# Patient Record
Sex: MALE | Race: WHITE | NOT HISPANIC OR LATINO
[De-identification: names, ages, dates, MRNs, and addresses within clinical notes are randomized per-mention and may not be internally consistent; named-entity substitution may affect disease eponyms.]

---

## 2017-08-28 ENCOUNTER — TRANSCRIPTION ENCOUNTER (OUTPATIENT)
Age: 52
End: 2017-08-28

## 2017-09-12 ENCOUNTER — TRANSCRIPTION ENCOUNTER (OUTPATIENT)
Age: 52
End: 2017-09-12

## 2018-02-15 ENCOUNTER — TRANSCRIPTION ENCOUNTER (OUTPATIENT)
Age: 53
End: 2018-02-15

## 2018-09-13 ENCOUNTER — TRANSCRIPTION ENCOUNTER (OUTPATIENT)
Age: 53
End: 2018-09-13

## 2018-12-24 ENCOUNTER — TRANSCRIPTION ENCOUNTER (OUTPATIENT)
Age: 53
End: 2018-12-24

## 2019-01-28 PROBLEM — Z00.00 ENCOUNTER FOR PREVENTIVE HEALTH EXAMINATION: Status: ACTIVE | Noted: 2019-01-28

## 2019-02-06 ENCOUNTER — APPOINTMENT (OUTPATIENT)
Dept: PAIN MANAGEMENT | Facility: CLINIC | Age: 54
End: 2019-02-06
Payer: COMMERCIAL

## 2019-02-06 VITALS
HEIGHT: 69 IN | DIASTOLIC BLOOD PRESSURE: 82 MMHG | BODY MASS INDEX: 28.14 KG/M2 | SYSTOLIC BLOOD PRESSURE: 136 MMHG | WEIGHT: 190 LBS

## 2019-02-06 PROCEDURE — 99204 OFFICE O/P NEW MOD 45 MIN: CPT

## 2019-02-06 NOTE — PHYSICAL EXAM
[Facet Tenderness] : facet tenderness [Spine: Flexion to ___ degrees, without pain] : spine: flexion to [unfilled] degrees, without pain [] : Motor: [NL] : normal and symmetric bilaterally [Normal] : Normal affect [de-identified] : left scapulothoracic bursa TTP

## 2019-02-06 NOTE — ASSESSMENT
[FreeTextEntry1] : mid back pain likely secondary to lumbar radiculopathy and discogenic pain refractory to conservative treatments, will obtain MRI CS and TS to evaluate for pathology\par \par may consider PT vs intervention pending eval\par \par Significant scapulothoracic pain refractory to conservative treatments.  Will schedule right scapulothoracic steroid injection r/b/a discussed\par \par informed patient of risks of steroid administration including transient worsening of blood glucose, htn, mood changes, progressive osteoporosis.  Encouraged to call with questions and concerns.\par \par trial voltaren prn pain\par \par The above diagnosis and treatment plan is medically reasonable and necessary based on the patient encounter.\par \par There were no barriers to communication.\par Informed patient that I would be available for any additional questions.\par Patient was instructed to call with any worsening symptoms including severe pain, new numbness/weakness, or changes in the bowel/bladder function. \par \par Discussed role of nsaids in pain management and all relevant risks, if patient is continuing to require after 4 weeks the patient should f/u for alternative treatment. \par \par Instructed patient to maintain pain diary to monitor pain level, mobility, and function.\par \par The referring provider was informed of the above diagnosis and treatment plan.\par \par

## 2019-02-06 NOTE — HISTORY OF PRESENT ILLNESS
[3] : a maximum pain level of 3/10 [Sharp] : sharp [Aching] : aching [Throbbing] : throbbing [Sitting] : sitting [Rest] : rest [FreeTextEntry1] : HPI\par \par Mr. MIRYAM SOSA is a 54 year M with pmhx of bilateral knee arthroscopic repair presents with left scapulothoracic pain\par \par \par Previous and current pain medications/doses/effects:\par \par Medrol pack without relief\par \par Previous Pain Treatments:\par \par Massage and PT without relief\par Acupuncture without relief\par \par Previous Pain Injections:\par \par Previous Diagnostic Studies/Images:\par \par MRI R Shoulder\par \par Mild/moderate supraspinatus tendinosis with superimposed low-grade partial-thickness articular   \par surface tearing of the anterior most insertional fibers.  \par  \par  Mild infraspinatus and subscapularis tendinosis without evidence for tear..  \par  \par  Small subacromial enthesophyte, for which evaluation for associated subacromial impingement on   \par physical examination is recommended.  \par  \par  [FreeTextEntry7] : left scapulothoracic pain [de-identified] : Gripping [FreeTextEntry4] : stretching

## 2019-02-06 NOTE — REASON FOR VISIT
[Initial Consultation] : an initial pain management consultation [FreeTextEntry2] : referred by Dr. Ba  for evaluation of mid back pain

## 2019-02-14 ENCOUNTER — APPOINTMENT (OUTPATIENT)
Dept: PAIN MANAGEMENT | Facility: HOSPITAL | Age: 54
End: 2019-02-14

## 2019-02-20 ENCOUNTER — RESULT REVIEW (OUTPATIENT)
Age: 54
End: 2019-02-20

## 2019-02-27 ENCOUNTER — APPOINTMENT (OUTPATIENT)
Dept: PAIN MANAGEMENT | Facility: CLINIC | Age: 54
End: 2019-02-27
Payer: COMMERCIAL

## 2019-02-27 VITALS
BODY MASS INDEX: 28.14 KG/M2 | DIASTOLIC BLOOD PRESSURE: 90 MMHG | WEIGHT: 190 LBS | HEIGHT: 69 IN | SYSTOLIC BLOOD PRESSURE: 138 MMHG

## 2019-02-27 PROCEDURE — 99214 OFFICE O/P EST MOD 30 MIN: CPT

## 2019-02-27 NOTE — ASSESSMENT
[FreeTextEntry1] : mid back pain likely secondary to lumbar radiculopathy and discogenic pain refractory to conservative treatments, \par \par \par MRI demonstrating disc herniation causing radiculopathy, significantly impactful to ability to perform ADL and refractory to conservative treatments.  Will schedule C7-T1 interlaminar epidural steroid injection r/b/a discussed\par \par informed patient of risks of steroid administration including transient worsening of blood glucose, htn, mood changes, progressive osteoporosis.  Encouraged to call with questions and concerns.\par \par may consider PT vs intervention pending eval\par \par s/p Significant scapulothoracic pain refractory to conservative treatments. s/p right scapulothoracic steroid injection with improvement in bursitis pain\par \par trial voltaren prn pain\par \par The above diagnosis and treatment plan is medically reasonable and necessary based on the patient encounter.\par \par There were no barriers to communication.\par Informed patient that I would be available for any additional questions.\par Patient was instructed to call with any worsening symptoms including severe pain, new numbness/weakness, or changes in the bowel/bladder function. \par \par Discussed role of nsaids in pain management and all relevant risks, if patient is continuing to require after 4 weeks the patient should f/u for alternative treatment. \par \par Instructed patient to maintain pain diary to monitor pain level, mobility, and function.\par \par \par \par

## 2019-02-27 NOTE — HISTORY OF PRESENT ILLNESS
[3] : a maximum pain level of 3/10 [Sharp] : sharp [Aching] : aching [Throbbing] : throbbing [Sitting] : sitting [Rest] : rest [FreeTextEntry1] : Interval Note:\par s/p left scapulothoracic bursa steroid injection with improvement over left shoulder, however now complains of worsening pain over left shoulder, lateral arm and upper left paraspinal region, worse with sitting, cervical motion.  Described as stabbing.  Significantly affecting ADLs\par Since last visit the pain is improved. Denies any additional weakness, numbness, bowel/bladder dysfunction.  Pain intensity is 5/10\par \par \par HPI\par \par Mr. MIRYAM SOSA is a 54 year M with pmhx of bilateral knee arthroscopic repair presents with left scapulothoracic pain\par \par \par Previous and current pain medications/doses/effects:\par \par Medrol pack without relief\par \par Previous Pain Treatments:\par \par Massage and PT without relief\par Acupuncture without relief\par \par Previous Pain Injections:\par  left scapulothoracic steroid injection 2/14/19\par \par Previous Diagnostic Studies/Images:\par \par MRI CS 2/2019\par \par There is a mild levoscoliosis of the lower cervical and upper thoracic spine and there is\par straightening of the cervical lordosis. The vertebral column alignment is grossly maintained. The\par marrow signal is overall within normal limits with no focal marrow replacing lesion identified. The\par vertebral body heights are maintained and there is no evidence of acute fracture or subluxation.\par There is very mild degenerative endplate edema at C5-6 and C6-7. There is otherwise no abnormal\par vertebral or paraspinal edema. The visualized paraspinal soft tissues appear grossly unremarkable.\par \par  No abnormal signal is identified in the cervical spinal cord. The visualized posterior fossa\par structures are unremarkable.\par \par  C2-3: No significant disc bulge or herniation. Bilateral facet hypertrophy. No significant central\par canal stenosis. Mild bilateral foraminal stenosis.\par \par  C3-4: Minimal disc bulge. Bilateral uncovertebral osteophyte formation, right greater than left. No\par significant central canal stenosis. Moderate/severe right foraminal stenosis and moderate left\par foraminal stenosis.\par \par  C4-5: Mild disc bulge and mild uncovertebral productive changes. No significant central canal\par stenosis. Moderate bilateral foraminal stenosis.\par \par  C5-6: Mild disc bulge with marginal and uncovertebral osteophyte formation. No significant central\par canal stenosis. Severe right foraminal stenosis and moderate/severe left foraminal stenosis.\par \par  C6-7: Mild disc bulge with marginal and uncovertebral osteophyte formation. Mild central canal\par stenosis. Severe bilateral foraminal stenosis.\par \par  C7-T1: No significant disc bulge or herniation. No significant central canal or foraminal stenosis.\par \par \par \par  IMPRESSION:\par \par  Cervical spondylosis. Mild central canal stenosis C6-7. Significant multilevel foraminal stenosis\par as above.\par \par MRI TS 2/2019\par \par There is a mild dextroscoliosis of the thoracic spine and there is mild increase in the thoracic\par kyphosis. The vertebral column alignment is overall maintained. The marrow signal is normal with no\par focal marrow replacing lesion identified. The vertebral body heights are maintained and there is no\par evidence of acute fracture or subluxation. Several small Schmorl's nodes are noted throughout the\par thoracic spine. There is no abnormal vertebral or paraspinal edema. The visualized paraspinal soft\par tissues appear grossly unremarkable.\par \par  No abnormal signal is identified in the thoracic spinal cord. The conus medullaris terminates at\par L1.\par \par  There are degenerative changes of the thoracic spine including mild multilevel disc space narrowing\par in its rule out endplate osteophyte formation as well as mild to moderate multilevel facet\par arthropathy. There is mildly prominent dorsal epidural fat extending from T3-T9.\par \par  At T3-4, there are small bilateral paracentral disc protrusions.\par \par  At T4-5, there is a small left paracentral disc protrusion mildly impinging upon the left ventral\par spinal cord.\par \par  At T5-6, there is a small right paracentral disc protrusion touching the right ventral spinal cord\par without compression\par \par  At T6-7, there is a tiny right paracentral disc protrusion.\par \par  At T7-8 and T8-9, there is mild disc bulging with mild encroachment upon the ventral spinal cord.\par \par  At T10-11, there is a moderate right paracentral disc protrusion mildly encroaching upon the right\par ventral spinal cord.\par \par  There is mild central canal stenosis at T4-5, T7-8, T8-9, and T10-11. There is mild foraminal\par stenosis at T9-10 and T10-11 on the right.\par \par \par \par  IMPRESSION:\par \par  Thoracic scoliosis and spondylosis with numerous superimposed disc herniations resulting in mild\par central canal and foraminal stenosis, as detailed above.\par \par \par MRI R Shoulder\par \par Mild/moderate supraspinatus tendinosis with superimposed low-grade partial-thickness articular   \par surface tearing of the anterior most insertional fibers.  \par  \par  Mild infraspinatus and subscapularis tendinosis without evidence for tear..  \par  \par  Small subacromial enthesophyte, for which evaluation for associated subacromial impingement on   \par physical examination is recommended.  \par  \par  [FreeTextEntry7] : left scapulothoracic pain [de-identified] : Gripping [FreeTextEntry4] : stretching

## 2019-02-27 NOTE — PHYSICAL EXAM
[Normal muscle bulk without asymmetry] : normal muscle bulk without asymmetry [Spine: Flexion to ___ degrees, without pain] : spine: flexion to [unfilled] degrees, without pain [Spurling] : positive Spurling's Test [] : Motor: [NL] : normal and symmetric bilaterally [Normal] : Normal affect

## 2019-03-14 ENCOUNTER — APPOINTMENT (OUTPATIENT)
Dept: PAIN MANAGEMENT | Facility: HOSPITAL | Age: 54
End: 2019-03-14

## 2019-03-15 ENCOUNTER — APPOINTMENT (OUTPATIENT)
Dept: PAIN MANAGEMENT | Facility: HOSPITAL | Age: 54
End: 2019-03-15

## 2019-04-05 ENCOUNTER — APPOINTMENT (OUTPATIENT)
Dept: PAIN MANAGEMENT | Facility: HOSPITAL | Age: 54
End: 2019-04-05

## 2019-04-05 ENCOUNTER — RESULT REVIEW (OUTPATIENT)
Age: 54
End: 2019-04-05

## 2019-04-17 ENCOUNTER — APPOINTMENT (OUTPATIENT)
Dept: PAIN MANAGEMENT | Facility: CLINIC | Age: 54
End: 2019-04-17
Payer: COMMERCIAL

## 2019-04-17 VITALS
BODY MASS INDEX: 28.14 KG/M2 | SYSTOLIC BLOOD PRESSURE: 140 MMHG | DIASTOLIC BLOOD PRESSURE: 92 MMHG | WEIGHT: 190 LBS | HEIGHT: 69 IN

## 2019-04-17 DIAGNOSIS — M47.814 SPONDYLOSIS W/OUT MYELOPATHY OR RADICULOPATHY, THORACIC REGION: ICD-10-CM

## 2019-04-17 PROCEDURE — 99214 OFFICE O/P EST MOD 30 MIN: CPT

## 2019-04-17 RX ORDER — DICLOFENAC SODIUM 10 MG/G
1 GEL TOPICAL
Qty: 1 | Refills: 1 | Status: DISCONTINUED | COMMUNITY
Start: 2019-02-06 | End: 2019-04-17

## 2019-04-17 NOTE — HISTORY OF PRESENT ILLNESS
[Sharp] : sharp [3] : a maximum pain level of 3/10 [Aching] : aching [Throbbing] : throbbing [Sitting] : sitting [Rest] : rest [FreeTextEntry1] : Interval Note:\par \par s/p  C7-T1 interlaminar epidural steroid injection 4/17/19 with improvement in upper back shoulder and arm pain.  Does complain of mild tingling in his fingers.  \par Since last visit the pain is improved. Denies any additional weakness, numbness, bowel/bladder dysfunction.  Pain intensity is 1/10\par \par \par HPI\par \par Mr. MIRYAM SOSA is a 54 year M with pmhx of bilateral knee arthroscopic repair presents with left scapulothoracic pain\par \par \par Previous and current pain medications/doses/effects:\par \par Medrol pack without relief\par \par Previous Pain Treatments:\par \par Massage and PT without relief\par Acupuncture without relief\par \par Previous Pain Injections:\par C7-T1 interlaminar epidural steroid injection 4/17/19\par  left scapulothoracic steroid injection 2/14/19\par \par Previous Diagnostic Studies/Images:\par \par MRI CS 2/2019\par \par There is a mild levoscoliosis of the lower cervical and upper thoracic spine and there is\par straightening of the cervical lordosis. The vertebral column alignment is grossly maintained. The\par marrow signal is overall within normal limits with no focal marrow replacing lesion identified. The\par vertebral body heights are maintained and there is no evidence of acute fracture or subluxation.\par There is very mild degenerative endplate edema at C5-6 and C6-7. There is otherwise no abnormal\par vertebral or paraspinal edema. The visualized paraspinal soft tissues appear grossly unremarkable.\par \par  No abnormal signal is identified in the cervical spinal cord. The visualized posterior fossa\par structures are unremarkable.\par \par  C2-3: No significant disc bulge or herniation. Bilateral facet hypertrophy. No significant central\par canal stenosis. Mild bilateral foraminal stenosis.\par \par  C3-4: Minimal disc bulge. Bilateral uncovertebral osteophyte formation, right greater than left. No\par significant central canal stenosis. Moderate/severe right foraminal stenosis and moderate left\par foraminal stenosis.\par \par  C4-5: Mild disc bulge and mild uncovertebral productive changes. No significant central canal\par stenosis. Moderate bilateral foraminal stenosis.\par \par  C5-6: Mild disc bulge with marginal and uncovertebral osteophyte formation. No significant central\par canal stenosis. Severe right foraminal stenosis and moderate/severe left foraminal stenosis.\par \par  C6-7: Mild disc bulge with marginal and uncovertebral osteophyte formation. Mild central canal\par stenosis. Severe bilateral foraminal stenosis.\par \par  C7-T1: No significant disc bulge or herniation. No significant central canal or foraminal stenosis.\par \par \par \par  IMPRESSION:\par \par  Cervical spondylosis. Mild central canal stenosis C6-7. Significant multilevel foraminal stenosis\par as above.\par \par MRI TS 2/2019\par \par There is a mild dextroscoliosis of the thoracic spine and there is mild increase in the thoracic\par kyphosis. The vertebral column alignment is overall maintained. The marrow signal is normal with no\par focal marrow replacing lesion identified. The vertebral body heights are maintained and there is no\par evidence of acute fracture or subluxation. Several small Schmorl's nodes are noted throughout the\par thoracic spine. There is no abnormal vertebral or paraspinal edema. The visualized paraspinal soft\par tissues appear grossly unremarkable.\par \par  No abnormal signal is identified in the thoracic spinal cord. The conus medullaris terminates at\par L1.\par \par  There are degenerative changes of the thoracic spine including mild multilevel disc space narrowing\par in its rule out endplate osteophyte formation as well as mild to moderate multilevel facet\par arthropathy. There is mildly prominent dorsal epidural fat extending from T3-T9.\par \par  At T3-4, there are small bilateral paracentral disc protrusions.\par \par  At T4-5, there is a small left paracentral disc protrusion mildly impinging upon the left ventral\par spinal cord.\par \par  At T5-6, there is a small right paracentral disc protrusion touching the right ventral spinal cord\par without compression\par \par  At T6-7, there is a tiny right paracentral disc protrusion.\par \par  At T7-8 and T8-9, there is mild disc bulging with mild encroachment upon the ventral spinal cord.\par \par  At T10-11, there is a moderate right paracentral disc protrusion mildly encroaching upon the right\par ventral spinal cord.\par \par  There is mild central canal stenosis at T4-5, T7-8, T8-9, and T10-11. There is mild foraminal\par stenosis at T9-10 and T10-11 on the right.\par \par \par \par  IMPRESSION:\par \par  Thoracic scoliosis and spondylosis with numerous superimposed disc herniations resulting in mild\par central canal and foraminal stenosis, as detailed above.\par \par \par MRI R Shoulder\par \par Mild/moderate supraspinatus tendinosis with superimposed low-grade partial-thickness articular   \par surface tearing of the anterior most insertional fibers.  \par  \par  Mild infraspinatus and subscapularis tendinosis without evidence for tear..  \par  \par  Small subacromial enthesophyte, for which evaluation for associated subacromial impingement on   \par physical examination is recommended.  \par  \par  [FreeTextEntry7] : left scapulothoracic pain [de-identified] : Gripping [FreeTextEntry4] : stretching

## 2019-04-17 NOTE — PHYSICAL EXAM
[Normal muscle bulk without asymmetry] : normal muscle bulk without asymmetry [Spine: Flexion to ___ degrees, without pain] : spine: flexion to [unfilled] degrees, without pain [] : Motor: [NL] : normal and symmetric bilaterally [Normal] : Normal affect

## 2019-04-17 NOTE — ASSESSMENT
[FreeTextEntry1] : mid back pain likely secondary to lumbar radiculopathy and discogenic pain refractory to conservative treatments, \par \par \par MRI demonstrating disc herniation causing radiculopathy, significantly impactful to ability to perform ADL and refractory to conservative treatments.  s/p C7-T1 interlaminar epidural steroid injection \par \par start PT\par \par s/p Significant scapulothoracic pain refractory to conservative treatments. s/p right scapulothoracic steroid injection with improvement in bursitis pain\par \par trial voltaren prn pain\par \par The above diagnosis and treatment plan is medically reasonable and necessary based on the patient encounter.\par \par There were no barriers to communication.\par Informed patient that I would be available for any additional questions.\par Patient was instructed to call with any worsening symptoms including severe pain, new numbness/weakness, or changes in the bowel/bladder function. \par \par Instructed patient to maintain pain diary to monitor pain level, mobility, and function.\par \par \par \par

## 2019-10-16 ENCOUNTER — RESULT REVIEW (OUTPATIENT)
Age: 54
End: 2019-10-16

## 2019-11-13 ENCOUNTER — APPOINTMENT (OUTPATIENT)
Dept: PAIN MANAGEMENT | Facility: CLINIC | Age: 54
End: 2019-11-13
Payer: COMMERCIAL

## 2019-11-13 VITALS
WEIGHT: 190 LBS | DIASTOLIC BLOOD PRESSURE: 92 MMHG | BODY MASS INDEX: 28.14 KG/M2 | HEIGHT: 69 IN | SYSTOLIC BLOOD PRESSURE: 164 MMHG

## 2019-11-13 DIAGNOSIS — M47.24 OTHER SPONDYLOSIS WITH RADICULOPATHY, THORACIC REGION: ICD-10-CM

## 2019-11-13 PROCEDURE — 99214 OFFICE O/P EST MOD 30 MIN: CPT

## 2019-11-13 NOTE — DATA REVIEWED
[FreeTextEntry1] : \par  [place] MRI Report             Final\par \par No Documents Attached\par \par \par \par \par   [facility]\par                                          [address]\par                                    [place], [place]  [address]\par                                        Department of Radiology\par                                             [phone]\par \par \par Patient Name:   MIRYAM SOSA        Location:[facility]\par Med Rec #:   [id number]        Account #: [id number]\par YOB: 1965       Ordering:Beatris Ahuja DO\par Age: 54       Sex:    M        Attending:Beatris Ahuja DO\par PCP:        Frank Cheung MD\par ______________________________________________________________________________________\par \par Exam Date:   02/20/19\par Exam:     MRI CERVICAL SPINE\par Order#:   [id number]\par \par \par \par MRI OF THE CERVICAL SPINE WITHOUT CONTRAST\par \par  INDICATION:  Cervical spondylosis without myelopathy. Left neck/scapular area pain\par \par  TECHNIQUE: Noncontrast sagittal T1, T2, STIR, axial T2 and gradient echo, and sagittal T2\par volumetric with axial and coronal reformats.\par \par  CONTRAST: None\par \par  COMPARISON:  No prior studies are available for comparison\par \par  FINDINGS:\par \par  There is a mild levoscoliosis of the lower cervical and upper thoracic spine and there is\par straightening of the cervical lordosis. The vertebral column alignment is grossly maintained. The\par marrow signal is overall within normal limits with no focal marrow replacing lesion identified. The\par vertebral body heights are maintained and there is no evidence of acute fracture or subluxation.\par There is very mild degenerative endplate edema at C5-6 and C6-7. There is otherwise no abnormal\par vertebral or paraspinal edema. The visualized paraspinal soft tissues appear grossly unremarkable.\par \par  No abnormal signal is identified in the cervical spinal cord. The visualized posterior fossa\par structures are unremarkable.\par \par  C2-3: No significant disc bulge or herniation. Bilateral facet hypertrophy. No significant central\par canal stenosis. Mild bilateral foraminal stenosis.\par \par  C3-4: Minimal disc bulge. Bilateral uncovertebral osteophyte formation, right greater than left. No\par significant central canal stenosis. Moderate/severe right foraminal stenosis and moderate left\par foraminal stenosis.\par \par  C4-5: Mild disc bulge and mild uncovertebral productive changes. No significant central canal\par stenosis. Moderate bilateral foraminal stenosis.\par \par  C5-6: Mild disc bulge with marginal and uncovertebral osteophyte formation. No significant central\par canal stenosis. Severe right foraminal stenosis and moderate/severe left foraminal stenosis.\par \par  C6-7: Mild disc bulge with marginal and uncovertebral osteophyte formation. Mild central canal\par stenosis. Severe bilateral foraminal stenosis.\par \par  C7-T1: No significant disc bulge or herniation. No significant central canal or foraminal stenosis.\par \par \par \par  IMPRESSION:\par \par  Cervical spondylosis. Mild central canal stenosis C6-7. Significant multilevel foraminal stenosis\par as above.\par \par \par ***Electronically Signed ***\par -----------------------------------------------\par Ted SOLIMAN MD              02/20/19 1328\par \par Dictated on 02/20/19 1307\par \par \par

## 2019-11-13 NOTE — PHYSICAL EXAM
[Spine: Flexion to ___ degrees, without pain] : spine: flexion to [unfilled] degrees, without pain [Normal muscle bulk without asymmetry] : normal muscle bulk without asymmetry [] : Motor: [NL] : normal and symmetric bilaterally [Normal] : Normal affect

## 2019-11-13 NOTE — ASSESSMENT
[FreeTextEntry1] : 54 yom presents w/ severe neck and left arm pain, similar to previous pain which had 100% relief of pain with epidural steroid injection. Pain has returned and is severe, patient cannot function due to pain. The patient has failed to have relief with medication management and physical therapy. Given the patients failure to improve with all other conservative measures, and excellent relief w/ previous intervention, recommend C7-T1 interlaminar epidural steroid injection under fluoroscopic guidance. The patient will follow-up with me in my office two weeks following intervention. Start cyclobenzaprine 5 mg TID prn.\par

## 2019-11-13 NOTE — HISTORY OF PRESENT ILLNESS
[3] : a maximum pain level of 3/10 [Sharp] : sharp [Aching] : aching [Throbbing] : throbbing [Sitting] : sitting [Rest] : rest [FreeTextEntry1] : Interval Note on 11/13/19. Patient reports that pain has returned and is severe in intensity. Pain is in the right upper neck and into the arm. He had 100% pain relief since previous injection. Pain is worst w/ sitting. Relieved when he lies on his stomach. Desires repeat intervention.\par \par As per Dr. Ahuja" 4/17/19: s/p  C7-T1 interlaminar epidural steroid injection 4/17/19 with improvement in upper back shoulder and arm pain.  Does complain of mild tingling in his fingers.  \par Since last visit the pain is improved. Denies any additional weakness, numbness, bowel/bladder dysfunction.  Pain intensity is 1/10\par \par \par HPI\par \par Mr. MIRYAM SOSA is a 54 year M with pmhx of bilateral knee arthroscopic repair presents with left scapulothoracic pain\par \par \par Previous and current pain medications/doses/effects:\par \par Medrol pack without relief\par \par Previous Pain Treatments:\par \par Massage and PT without relief\par Acupuncture without relief\par \par Previous Pain Injections:\par C7-T1 interlaminar epidural steroid injection 4/17/19\par  left scapulothoracic steroid injection 2/14/19\par \par Previous Diagnostic Studies/Images:\par \par MRI CS 2/2019\par \par There is a mild levoscoliosis of the lower cervical and upper thoracic spine and there is\par straightening of the cervical lordosis. The vertebral column alignment is grossly maintained. The\par marrow signal is overall within normal limits with no focal marrow replacing lesion identified. The\par vertebral body heights are maintained and there is no evidence of acute fracture or subluxation.\par There is very mild degenerative endplate edema at C5-6 and C6-7. There is otherwise no abnormal\par vertebral or paraspinal edema. The visualized paraspinal soft tissues appear grossly unremarkable.\par \par  No abnormal signal is identified in the cervical spinal cord. The visualized posterior fossa\par structures are unremarkable.\par \par  C2-3: No significant disc bulge or herniation. Bilateral facet hypertrophy. No significant central\par canal stenosis. Mild bilateral foraminal stenosis.\par \par  C3-4: Minimal disc bulge. Bilateral uncovertebral osteophyte formation, right greater than left. No\par significant central canal stenosis. Moderate/severe right foraminal stenosis and moderate left\par foraminal stenosis.\par \par  C4-5: Mild disc bulge and mild uncovertebral productive changes. No significant central canal\par stenosis. Moderate bilateral foraminal stenosis.\par \par  C5-6: Mild disc bulge with marginal and uncovertebral osteophyte formation. No significant central\par canal stenosis. Severe right foraminal stenosis and moderate/severe left foraminal stenosis.\par \par  C6-7: Mild disc bulge with marginal and uncovertebral osteophyte formation. Mild central canal\par stenosis. Severe bilateral foraminal stenosis.\par \par  C7-T1: No significant disc bulge or herniation. No significant central canal or foraminal stenosis.\par \par \par \par  IMPRESSION:\par \par  Cervical spondylosis. Mild central canal stenosis C6-7. Significant multilevel foraminal stenosis\par as above.\par \par MRI TS 2/2019\par \par There is a mild dextroscoliosis of the thoracic spine and there is mild increase in the thoracic\par kyphosis. The vertebral column alignment is overall maintained. The marrow signal is normal with no\par focal marrow replacing lesion identified. The vertebral body heights are maintained and there is no\par evidence of acute fracture or subluxation. Several small Schmorl's nodes are noted throughout the\par thoracic spine. There is no abnormal vertebral or paraspinal edema. The visualized paraspinal soft\par tissues appear grossly unremarkable.\par \par  No abnormal signal is identified in the thoracic spinal cord. The conus medullaris terminates at\par L1.\par \par  There are degenerative changes of the thoracic spine including mild multilevel disc space narrowing\par in its rule out endplate osteophyte formation as well as mild to moderate multilevel facet\par arthropathy. There is mildly prominent dorsal epidural fat extending from T3-T9.\par \par  At T3-4, there are small bilateral paracentral disc protrusions.\par \par  At T4-5, there is a small left paracentral disc protrusion mildly impinging upon the left ventral\par spinal cord.\par \par  At T5-6, there is a small right paracentral disc protrusion touching the right ventral spinal cord\par without compression\par \par  At T6-7, there is a tiny right paracentral disc protrusion.\par \par  At T7-8 and T8-9, there is mild disc bulging with mild encroachment upon the ventral spinal cord.\par \par  At T10-11, there is a moderate right paracentral disc protrusion mildly encroaching upon the right\par ventral spinal cord.\par \par  There is mild central canal stenosis at T4-5, T7-8, T8-9, and T10-11. There is mild foraminal\par stenosis at T9-10 and T10-11 on the right.\par \par \par \par  IMPRESSION:\par \par  Thoracic scoliosis and spondylosis with numerous superimposed disc herniations resulting in mild\par central canal and foraminal stenosis, as detailed above.\par \par \par MRI R Shoulder\par \par Mild/moderate supraspinatus tendinosis with superimposed low-grade partial-thickness articular   \par surface tearing of the anterior most insertional fibers.  \par  \par  Mild infraspinatus and subscapularis tendinosis without evidence for tear..  \par  \par  Small subacromial enthesophyte, for which evaluation for associated subacromial impingement on   \par physical examination is recommended."  \par  \par  [FreeTextEntry7] : left scapulothoracic pain [de-identified] : Gripping [FreeTextEntry4] : stretching

## 2019-11-19 ENCOUNTER — APPOINTMENT (OUTPATIENT)
Dept: PAIN MANAGEMENT | Facility: HOSPITAL | Age: 54
End: 2019-11-19

## 2019-11-19 ENCOUNTER — RESULT REVIEW (OUTPATIENT)
Age: 54
End: 2019-11-19

## 2019-12-05 ENCOUNTER — APPOINTMENT (OUTPATIENT)
Dept: PAIN MANAGEMENT | Facility: CLINIC | Age: 54
End: 2019-12-05
Payer: COMMERCIAL

## 2019-12-05 VITALS
HEIGHT: 69 IN | SYSTOLIC BLOOD PRESSURE: 170 MMHG | DIASTOLIC BLOOD PRESSURE: 100 MMHG | BODY MASS INDEX: 28.88 KG/M2 | WEIGHT: 195 LBS

## 2019-12-05 DIAGNOSIS — M75.50 BURSITIS OF UNSPECIFIED SHOULDER: ICD-10-CM

## 2019-12-05 PROCEDURE — 99214 OFFICE O/P EST MOD 30 MIN: CPT

## 2019-12-05 NOTE — DATA REVIEWED
[FreeTextEntry1] : MRI CERVICAL SPINE (02/20/19):\par \par  There is a mild levoscoliosis of the lower cervical and upper thoracic spine and there is\par straightening of the cervical lordosis. The vertebral column alignment is grossly maintained. The\par marrow signal is overall within normal limits with no focal marrow replacing lesion identified. The\par vertebral body heights are maintained and there is no evidence of acute fracture or subluxation.\par There is very mild degenerative endplate edema at C5-6 and C6-7. There is otherwise no abnormal\par vertebral or paraspinal edema. The visualized paraspinal soft tissues appear grossly unremarkable.\par \par  No abnormal signal is identified in the cervical spinal cord. The visualized posterior fossa\par structures are unremarkable.\par \par  C2-3: No significant disc bulge or herniation. Bilateral facet hypertrophy. No significant central\par canal stenosis. Mild bilateral foraminal stenosis.\par \par  C3-4: Minimal disc bulge. Bilateral uncovertebral osteophyte formation, right greater than left. No\par significant central canal stenosis. Moderate/severe right foraminal stenosis and moderate left\par foraminal stenosis.\par \par  C4-5: Mild disc bulge and mild uncovertebral productive changes. No significant central canal\par stenosis. Moderate bilateral foraminal stenosis.\par \par  C5-6: Mild disc bulge with marginal and uncovertebral osteophyte formation. No significant central\par canal stenosis. Severe right foraminal stenosis and moderate/severe left foraminal stenosis.\par \par  C6-7: Mild disc bulge with marginal and uncovertebral osteophyte formation. Mild central canal\par stenosis. Severe bilateral foraminal stenosis.\par \par  C7-T1: No significant disc bulge or herniation. No significant central canal or foraminal stenosis.\par \par \par \par  IMPRESSION:\par \par  Cervical spondylosis. Mild central canal stenosis C6-7. Significant multilevel foraminal stenosis\par as above.\par \par \par ***Electronically Signed ***\par -----------------------------------------------\par Ted SOLIMAN MD              02/20/19 1328\par \par Dictated on 02/20/19 1307\par \par \par

## 2019-12-05 NOTE — REVIEW OF SYSTEMS
[Neck Pain] : neck pain [Muscle Pain] : muscle pain [Joint Stiffness] : joint stiffness [Decreased ROM] : decreased range of motion [Negative] : Heme/Lymph

## 2019-12-05 NOTE — ASSESSMENT
[FreeTextEntry1] : 54 yom presents w/ severe neck and left arm pain, similar to previous pain but now without significant relief following BRAYDEN. Continue cyclobenzaprine 5 mg TID prn for musclar pain. Start gabapentin 300 mg TID for neuropathic pain. I have personally reviewed the patient's MRI in detail and discussed it with them which is significant for multiple levels of foraminal stenosis. The patient has failed to have relief with medication management and physical therapy. Given the patients failure to improve with all other conservative measures, and facet-mediated pain,recommend let C3, C4, C5, and C6 medial branch diagnostic block under fluoroscopic guidance. The patient will follow-up with me in my office two weeks following intervention, if significant relief, proceed to RFA. If no relief, consider repeat BRAYDEN. Patient to see Dr. Layo Baig for consultation.\par \par

## 2019-12-05 NOTE — HISTORY OF PRESENT ILLNESS
[6] : 3. What number best describes how, during the past week, pain has interfered with your general activity? 6/10 pain [3] : a maximum pain level of 3/10 [Sharp] : sharp [Throbbing] : throbbing [Aching] : aching [Sitting] : sitting [Rest] : rest [FreeTextEntry1] : Interval note 12/05/19: Patient returns to office today. He reports only short-term relief from his epidural steroid injection. Quality of life remained significantly impaired. Pain radiates down the arm to the elbow. Worst pain is in the upper trapezius area. No relief with acetaminophen or muscle relaxants. Pain is worse with activity. Pain improves with rest. Quality of life is significantly impaired.\par \par Interval Note on 11/13/19. Patient reports that pain has returned and is severe in intensity. Pain is in the right upper neck and into the arm. He had 100% pain relief since previous injection. Pain is worst w/ sitting. Relieved when he lies on his stomach. Desires repeat intervention.\par \par As per Dr. Ahuja" 4/17/19: s/p  C7-T1 interlaminar epidural steroid injection 4/17/19 with improvement in upper back shoulder and arm pain.  Does complain of mild tingling in his fingers.  \par Since last visit the pain is improved. Denies any additional weakness, numbness, bowel/bladder dysfunction.  Pain intensity is 1/10\par \par \par HPI\par \par Mr. MIRYAM SOSA is a 54 year M with pmhx of bilateral knee arthroscopic repair presents with left scapulothoracic pain\par \par \par Previous and current pain medications/doses/effects:\par \par Medrol pack without relief\par \par Previous Pain Treatments:\par \par Massage and PT without relief\par Acupuncture without relief\par \par Previous Pain Injections:\par C7-T1 interlaminar epidural steroid injection 4/17/19\par  left scapulothoracic steroid injection 2/14/19\par \par Previous Diagnostic Studies/Images:\par \par MRI CS 2/2019\par \par There is a mild levoscoliosis of the lower cervical and upper thoracic spine and there is\par straightening of the cervical lordosis. The vertebral column alignment is grossly maintained. The\par marrow signal is overall within normal limits with no focal marrow replacing lesion identified. The\par vertebral body heights are maintained and there is no evidence of acute fracture or subluxation.\par There is very mild degenerative endplate edema at C5-6 and C6-7. There is otherwise no abnormal\par vertebral or paraspinal edema. The visualized paraspinal soft tissues appear grossly unremarkable.\par \par  No abnormal signal is identified in the cervical spinal cord. The visualized posterior fossa\par structures are unremarkable.\par \par  C2-3: No significant disc bulge or herniation. Bilateral facet hypertrophy. No significant central\par canal stenosis. Mild bilateral foraminal stenosis.\par \par  C3-4: Minimal disc bulge. Bilateral uncovertebral osteophyte formation, right greater than left. No\par significant central canal stenosis. Moderate/severe right foraminal stenosis and moderate left\par foraminal stenosis.\par \par  C4-5: Mild disc bulge and mild uncovertebral productive changes. No significant central canal\par stenosis. Moderate bilateral foraminal stenosis.\par \par  C5-6: Mild disc bulge with marginal and uncovertebral osteophyte formation. No significant central\par canal stenosis. Severe right foraminal stenosis and moderate/severe left foraminal stenosis.\par \par  C6-7: Mild disc bulge with marginal and uncovertebral osteophyte formation. Mild central canal\par stenosis. Severe bilateral foraminal stenosis.\par \par  C7-T1: No significant disc bulge or herniation. No significant central canal or foraminal stenosis.\par \par \par \par  IMPRESSION:\par \par  Cervical spondylosis. Mild central canal stenosis C6-7. Significant multilevel foraminal stenosis\par as above.\par \par MRI TS 2/2019\par \par There is a mild dextroscoliosis of the thoracic spine and there is mild increase in the thoracic\par kyphosis. The vertebral column alignment is overall maintained. The marrow signal is normal with no\par focal marrow replacing lesion identified. The vertebral body heights are maintained and there is no\par evidence of acute fracture or subluxation. Several small Schmorl's nodes are noted throughout the\par thoracic spine. There is no abnormal vertebral or paraspinal edema. The visualized paraspinal soft\par tissues appear grossly unremarkable.\par \par  No abnormal signal is identified in the thoracic spinal cord. The conus medullaris terminates at\par L1.\par \par  There are degenerative changes of the thoracic spine including mild multilevel disc space narrowing\par in its rule out endplate osteophyte formation as well as mild to moderate multilevel facet\par arthropathy. There is mildly prominent dorsal epidural fat extending from T3-T9.\par \par  At T3-4, there are small bilateral paracentral disc protrusions.\par \par  At T4-5, there is a small left paracentral disc protrusion mildly impinging upon the left ventral\par spinal cord.\par \par  At T5-6, there is a small right paracentral disc protrusion touching the right ventral spinal cord\par without compression\par \par  At T6-7, there is a tiny right paracentral disc protrusion.\par \par  At T7-8 and T8-9, there is mild disc bulging with mild encroachment upon the ventral spinal cord.\par \par  At T10-11, there is a moderate right paracentral disc protrusion mildly encroaching upon the right\par ventral spinal cord.\par \par  There is mild central canal stenosis at T4-5, T7-8, T8-9, and T10-11. There is mild foraminal\par stenosis at T9-10 and T10-11 on the right.\par \par \par \par  IMPRESSION:\par \par  Thoracic scoliosis and spondylosis with numerous superimposed disc herniations resulting in mild\par central canal and foraminal stenosis, as detailed above.\par \par \par MRI R Shoulder\par \par Mild/moderate supraspinatus tendinosis with superimposed low-grade partial-thickness articular   \par surface tearing of the anterior most insertional fibers.  \par  \par  Mild infraspinatus and subscapularis tendinosis without evidence for tear..  \par  \par  Small subacromial enthesophyte, for which evaluation for associated subacromial impingement on   \par physical examination is recommended."  \par  \par  [FreeTextEntry7] : left scapulothoracic pain [de-identified] : Gripping [FreeTextEntry4] : stretching [FreeTextEntry2] : 18

## 2019-12-17 ENCOUNTER — RESULT REVIEW (OUTPATIENT)
Age: 54
End: 2019-12-17

## 2019-12-17 ENCOUNTER — APPOINTMENT (OUTPATIENT)
Dept: PAIN MANAGEMENT | Facility: HOSPITAL | Age: 54
End: 2019-12-17

## 2019-12-18 ENCOUNTER — APPOINTMENT (OUTPATIENT)
Dept: PAIN MANAGEMENT | Facility: CLINIC | Age: 54
End: 2019-12-18
Payer: COMMERCIAL

## 2019-12-18 VITALS
WEIGHT: 195 LBS | DIASTOLIC BLOOD PRESSURE: 92 MMHG | SYSTOLIC BLOOD PRESSURE: 140 MMHG | BODY MASS INDEX: 28.88 KG/M2 | HEIGHT: 69 IN

## 2019-12-18 DIAGNOSIS — M54.12 RADICULOPATHY, CERVICAL REGION: ICD-10-CM

## 2019-12-18 DIAGNOSIS — M79.18 MYALGIA, OTHER SITE: ICD-10-CM

## 2019-12-18 PROCEDURE — 99214 OFFICE O/P EST MOD 30 MIN: CPT

## 2019-12-18 NOTE — HISTORY OF PRESENT ILLNESS
[2] : 2. What number best describes how, during the past week, pain has interfered with your enjoyment of life? 2/10 pain [3] : a maximum pain level of 3/10 [Sharp] : sharp [Aching] : aching [Throbbing] : throbbing [Sitting] : sitting [Rest] : rest [6] : 3. What number best describes how, during the past week, pain has interfered with your general activity? 6/10 pain [FreeTextEntry1] : Pt returns for follow-up today, 12/18/19. He is s/p left C3, C4, C5 and C6 medial branch diagnostic block with excellent results. Reports 95% relief of neck and shoulder pain. Able to sit at his desk without pain which he had not been able to do previously. He wishes to pursue further intervention. No side effects from injection. \par \par As per my note dated 12/05/19: "Patient returns to office today. He reports only short-term relief from his epidural steroid injection. Quality of life remained significantly impaired. Pain radiates down the arm to the elbow. Worst pain is in the upper trapezius area. No relief with acetaminophen or muscle relaxants. Pain is worse with activity. Pain improves with rest. Quality of life is significantly impaired."\par \par Interval Note on 11/13/19. Patient reports that pain has returned and is severe in intensity. Pain is in the right upper neck and into the arm. He had 100% pain relief since previous injection. Pain is worst w/ sitting. Relieved when he lies on his stomach. Desires repeat intervention.\par \par As per Dr. Ahuja" 4/17/19: s/p  C7-T1 interlaminar epidural steroid injection 4/17/19 with improvement in upper back shoulder and arm pain.  Does complain of mild tingling in his fingers.  \par Since last visit the pain is improved. Denies any additional weakness, numbness, bowel/bladder dysfunction.  Pain intensity is 1/10\par \par \par HPI\par \par Mr. MIRYAM SOSA is a 54 year M with pmhx of bilateral knee arthroscopic repair presents with left scapulothoracic pain\par \par \par Previous and current pain medications/doses/effects:\par \par Medrol pack without relief\par \par Previous Pain Treatments:\par \par Massage and PT without relief\par Acupuncture without relief\par \par Previous Pain Injections:\par C7-T1 interlaminar epidural steroid injection 4/17/19\par  left scapulothoracic steroid injection 2/14/19\par \par Previous Diagnostic Studies/Images:\par \par MRI CS 2/2019\par \par There is a mild levoscoliosis of the lower cervical and upper thoracic spine and there is\par straightening of the cervical lordosis. The vertebral column alignment is grossly maintained. The\par marrow signal is overall within normal limits with no focal marrow replacing lesion identified. The\par vertebral body heights are maintained and there is no evidence of acute fracture or subluxation.\par There is very mild degenerative endplate edema at C5-6 and C6-7. There is otherwise no abnormal\par vertebral or paraspinal edema. The visualized paraspinal soft tissues appear grossly unremarkable.\par \par  No abnormal signal is identified in the cervical spinal cord. The visualized posterior fossa\par structures are unremarkable.\par \par  C2-3: No significant disc bulge or herniation. Bilateral facet hypertrophy. No significant central\par canal stenosis. Mild bilateral foraminal stenosis.\par \par  C3-4: Minimal disc bulge. Bilateral uncovertebral osteophyte formation, right greater than left. No\par significant central canal stenosis. Moderate/severe right foraminal stenosis and moderate left\par foraminal stenosis.\par \par  C4-5: Mild disc bulge and mild uncovertebral productive changes. No significant central canal\par stenosis. Moderate bilateral foraminal stenosis.\par \par  C5-6: Mild disc bulge with marginal and uncovertebral osteophyte formation. No significant central\par canal stenosis. Severe right foraminal stenosis and moderate/severe left foraminal stenosis.\par \par  C6-7: Mild disc bulge with marginal and uncovertebral osteophyte formation. Mild central canal\par stenosis. Severe bilateral foraminal stenosis.\par \par  C7-T1: No significant disc bulge or herniation. No significant central canal or foraminal stenosis.\par \par \par \par  IMPRESSION:\par \par  Cervical spondylosis. Mild central canal stenosis C6-7. Significant multilevel foraminal stenosis\par as above.\par \par MRI TS 2/2019\par \par There is a mild dextroscoliosis of the thoracic spine and there is mild increase in the thoracic\par kyphosis. The vertebral column alignment is overall maintained. The marrow signal is normal with no\par focal marrow replacing lesion identified. The vertebral body heights are maintained and there is no\par evidence of acute fracture or subluxation. Several small Schmorl's nodes are noted throughout the\par thoracic spine. There is no abnormal vertebral or paraspinal edema. The visualized paraspinal soft\par tissues appear grossly unremarkable.\par \par  No abnormal signal is identified in the thoracic spinal cord. The conus medullaris terminates at\par L1.\par \par  There are degenerative changes of the thoracic spine including mild multilevel disc space narrowing\par in its rule out endplate osteophyte formation as well as mild to moderate multilevel facet\par arthropathy. There is mildly prominent dorsal epidural fat extending from T3-T9.\par \par  At T3-4, there are small bilateral paracentral disc protrusions.\par \par  At T4-5, there is a small left paracentral disc protrusion mildly impinging upon the left ventral\par spinal cord.\par \par  At T5-6, there is a small right paracentral disc protrusion touching the right ventral spinal cord\par without compression\par \par  At T6-7, there is a tiny right paracentral disc protrusion.\par \par  At T7-8 and T8-9, there is mild disc bulging with mild encroachment upon the ventral spinal cord.\par \par  At T10-11, there is a moderate right paracentral disc protrusion mildly encroaching upon the right\par ventral spinal cord.\par \par  There is mild central canal stenosis at T4-5, T7-8, T8-9, and T10-11. There is mild foraminal\par stenosis at T9-10 and T10-11 on the right.\par \par \par \par  IMPRESSION:\par \par  Thoracic scoliosis and spondylosis with numerous superimposed disc herniations resulting in mild\par central canal and foraminal stenosis, as detailed above.\par \par \par MRI R Shoulder\par \par Mild/moderate supraspinatus tendinosis with superimposed low-grade partial-thickness articular   \par surface tearing of the anterior most insertional fibers.  \par  \par  Mild infraspinatus and subscapularis tendinosis without evidence for tear..  \par  \par  Small subacromial enthesophyte, for which evaluation for associated subacromial impingement on   \par physical examination is recommended."  \par  \par  [FreeTextEntry7] : left scapulothoracic pain [de-identified] : Gripping [FreeTextEntry4] : stretching [FreeTextEntry2] : 18

## 2019-12-18 NOTE — PHYSICAL EXAM
[Spine: Flexion to ___ degrees, without pain] : spine: flexion to [unfilled] degrees, without pain [Normal muscle bulk without asymmetry] : normal muscle bulk without asymmetry [] : Motor: [NL] : normal and symmetric bilaterally [Normal] : Normal affect [de-identified] : Constitutional: Well-developed, in no acute distress\par Eyes: Pupil- Right: normal, Left: normal\par Neck exam: Inspection normal\par Respiratory: Normal effort, speaking in full sentences\par Cardiovascular: Regular rate and rhythm\par Vascular: Dorsal pedis pulses normal and equal bilaterally\par Abdomen: Inspection normal, no distension\par Skin: Inspection normal, no bruising noted\par Musculoskeletal:\par Cervical Spine:   \par Gait: Antalgic\par Inspection: Normal curvature, no abnormal kyphosis or scoliosis\par \par Facet loading: pain bilaterally\par \par Palpation:\par Cervical paraspinal muscles: pain bilaterally\par Pain with extension\par 		\par Muscle Strength:\par Deltoid: 5/5 bilaterally\par Biceps: 5/5 bilaterally\par Triceps: 5/5 bilaterally\par Adductor pollicis: 5/5 bilaterally\par \par Sensation: normal and equal in bilateral upper extremities\par \par Reflexes:\par Biceps (C5): 2+ bilaterally\par Brachioradialis (C6): 2+ bilaterally\par Triceps (C7): 2+ bilaterally\par \par Extremity: no edema noted\par Neurological: Memory normal, AAO x 3, Cranial nerves II - XII grossly normal\par Psychiatric: Appropriate mood and affect, oriented to time, place, person, and situation\par \par \par

## 2019-12-18 NOTE — ASSESSMENT
[FreeTextEntry1] : 54 yom presents w/ severe neck and left arm pain, now s/p left C3, C4, C5, and C6 medial branch diagnostic block with excellent relief, almost 95% for over 20 hours and able to perform activities of daily living which he was unable to do priorI. I have again personally reviewed the patient's MRI in detail and discussed it with them which is significant for multiple levels of foraminal stenosis. The patient has failed to have relief with medication management and physical therapy. Given the patients failure to improve with all other conservative measures, and excellent relief from diagnostic block, recommend left C3, C4, C5, and C6 medial branch cooled radiofrequency ablation under fluoroscopic guidance. Continue gabapentin and cyclobenzaprine as prescribed.\par \par

## 2019-12-24 ENCOUNTER — RESULT REVIEW (OUTPATIENT)
Age: 54
End: 2019-12-24

## 2019-12-24 ENCOUNTER — APPOINTMENT (OUTPATIENT)
Dept: PAIN MANAGEMENT | Facility: HOSPITAL | Age: 54
End: 2019-12-24

## 2020-02-14 ENCOUNTER — APPOINTMENT (OUTPATIENT)
Dept: CARDIOLOGY | Facility: CLINIC | Age: 55
End: 2020-02-14
Payer: COMMERCIAL

## 2020-02-14 ENCOUNTER — NON-APPOINTMENT (OUTPATIENT)
Age: 55
End: 2020-02-14

## 2020-02-14 VITALS
SYSTOLIC BLOOD PRESSURE: 140 MMHG | BODY MASS INDEX: 28.88 KG/M2 | HEIGHT: 69 IN | DIASTOLIC BLOOD PRESSURE: 80 MMHG | WEIGHT: 195 LBS

## 2020-02-14 DIAGNOSIS — R06.02 SHORTNESS OF BREATH: ICD-10-CM

## 2020-02-14 DIAGNOSIS — R42 DIZZINESS AND GIDDINESS: ICD-10-CM

## 2020-02-14 PROCEDURE — 93000 ELECTROCARDIOGRAM COMPLETE: CPT

## 2020-02-14 PROCEDURE — 99204 OFFICE O/P NEW MOD 45 MIN: CPT

## 2020-02-14 NOTE — HISTORY OF PRESENT ILLNESS
[FreeTextEntry1] : Self-referral\par 55-year-old male with neck injuries found to have elevated blood pressures in the past who was on a diuretic therapy as well as [place] who presents for followup of hypertension. Patient complains of intermittent shortness of breath on exertion and intermittent dizziness. He denies any chest pain, edema, orthopnea, PND. Patient is nonsmoker. Patient plays soccer regularly however has noticed a mild decrease in exercise tolerance however believes this is his age.

## 2020-02-14 NOTE — ASSESSMENT
[FreeTextEntry1] : 55-year-old male with hypertension\par Patient no longer on diuretic therapy\par Blood pressure controlled on Avapro however states that he has a cough\par Recommend discontinuation of ACE inhibitor and monitoring blood pressure is consistently over 140/90 we'll recommend losartan\par Treadmill stress test to evaluate peak blood pressure \par For intermittent dizziness will recommend carotid ultrasound\par If patient has evidence of plaque he may continue his Lipitor 4 times a week\par Obtain lipid panel and LFTs from primary\par Patient states that his LFTs were elevated in the past due to daily statin use

## 2020-02-14 NOTE — PHYSICAL EXAM
[General Appearance - Well Developed] : well developed [Normal Appearance] : normal appearance [Well Groomed] : well groomed [General Appearance - Well Nourished] : well nourished [No Deformities] : no deformities [General Appearance - In No Acute Distress] : no acute distress [Normal Conjunctiva] : the conjunctiva exhibited no abnormalities [Eyelids - No Xanthelasma] : the eyelids demonstrated no xanthelasmas [Normal Oral Mucosa] : normal oral mucosa [No Oral Pallor] : no oral pallor [No Oral Cyanosis] : no oral cyanosis [Normal Jugular Venous A Waves Present] : normal jugular venous A waves present [Normal Jugular Venous V Waves Present] : normal jugular venous V waves present [No Jugular Venous López A Waves] : no jugular venous lópez A waves [Heart Rate And Rhythm] : heart rate and rhythm were normal [Heart Sounds] : normal S1 and S2 [Murmurs] : no murmurs present [Respiration, Rhythm And Depth] : normal respiratory rhythm and effort [Exaggerated Use Of Accessory Muscles For Inspiration] : no accessory muscle use [Auscultation Breath Sounds / Voice Sounds] : lungs were clear to auscultation bilaterally [Abdomen Soft] : soft [Abdomen Tenderness] : non-tender [Abdomen Mass (___ Cm)] : no abdominal mass palpated [Abnormal Walk] : normal gait [Gait - Sufficient For Exercise Testing] : the gait was sufficient for exercise testing [Nail Clubbing] : no clubbing of the fingernails [Cyanosis, Localized] : no localized cyanosis [Petechial Hemorrhages (___cm)] : no petechial hemorrhages [Skin Color & Pigmentation] : normal skin color and pigmentation [] : no rash [No Venous Stasis] : no venous stasis [Skin Lesions] : no skin lesions [No Skin Ulcers] : no skin ulcer [No Xanthoma] : no  xanthoma was observed [Oriented To Time, Place, And Person] : oriented to person, place, and time [Affect] : the affect was normal [Mood] : the mood was normal [No Anxiety] : not feeling anxious

## 2020-03-02 ENCOUNTER — RESULT REVIEW (OUTPATIENT)
Age: 55
End: 2020-03-02

## 2020-08-07 ENCOUNTER — APPOINTMENT (OUTPATIENT)
Dept: PAIN MANAGEMENT | Facility: CLINIC | Age: 55
End: 2020-08-07
Payer: COMMERCIAL

## 2020-08-07 DIAGNOSIS — M54.16 RADICULOPATHY, LUMBAR REGION: ICD-10-CM

## 2020-08-07 PROCEDURE — 99443: CPT

## 2020-08-10 ENCOUNTER — RESULT REVIEW (OUTPATIENT)
Age: 55
End: 2020-08-10

## 2020-08-21 ENCOUNTER — RESULT REVIEW (OUTPATIENT)
Age: 55
End: 2020-08-21

## 2020-08-24 ENCOUNTER — APPOINTMENT (OUTPATIENT)
Dept: PAIN MANAGEMENT | Facility: HOSPITAL | Age: 55
End: 2020-08-24

## 2020-08-24 ENCOUNTER — TRANSCRIPTION ENCOUNTER (OUTPATIENT)
Age: 55
End: 2020-08-24

## 2020-09-13 ENCOUNTER — RESULT REVIEW (OUTPATIENT)
Age: 55
End: 2020-09-13

## 2020-09-15 ENCOUNTER — APPOINTMENT (OUTPATIENT)
Dept: PAIN MANAGEMENT | Facility: HOSPITAL | Age: 55
End: 2020-09-15

## 2020-10-01 ENCOUNTER — APPOINTMENT (OUTPATIENT)
Dept: PAIN MANAGEMENT | Facility: HOSPITAL | Age: 55
End: 2020-10-01

## 2020-10-10 ENCOUNTER — RESULT REVIEW (OUTPATIENT)
Age: 55
End: 2020-10-10

## 2020-10-13 ENCOUNTER — APPOINTMENT (OUTPATIENT)
Dept: PAIN MANAGEMENT | Facility: HOSPITAL | Age: 55
End: 2020-10-13

## 2020-12-06 ENCOUNTER — RESULT REVIEW (OUTPATIENT)
Age: 55
End: 2020-12-06

## 2020-12-08 ENCOUNTER — RESULT REVIEW (OUTPATIENT)
Age: 55
End: 2020-12-08

## 2020-12-08 ENCOUNTER — APPOINTMENT (OUTPATIENT)
Dept: PAIN MANAGEMENT | Facility: HOSPITAL | Age: 55
End: 2020-12-08

## 2021-01-20 ENCOUNTER — NON-APPOINTMENT (OUTPATIENT)
Age: 56
End: 2021-01-20

## 2021-01-21 ENCOUNTER — APPOINTMENT (OUTPATIENT)
Dept: GASTROENTEROLOGY | Facility: CLINIC | Age: 56
End: 2021-01-21
Payer: COMMERCIAL

## 2021-01-21 VITALS
TEMPERATURE: 98.1 F | WEIGHT: 187.5 LBS | HEART RATE: 80 BPM | BODY MASS INDEX: 27.77 KG/M2 | SYSTOLIC BLOOD PRESSURE: 126 MMHG | DIASTOLIC BLOOD PRESSURE: 82 MMHG | HEIGHT: 69 IN

## 2021-01-21 PROCEDURE — 99072 ADDL SUPL MATRL&STAF TM PHE: CPT

## 2021-01-21 PROCEDURE — 99204 OFFICE O/P NEW MOD 45 MIN: CPT

## 2021-01-21 NOTE — ASSESSMENT
[FreeTextEntry1] : \par 1. LLQ pain\par      R/O abdominal wall strain/spasm/ partial tear, no defect or hernia appreciated\par      R/o diverticulosis\par      IBD\par      Colonic neoplasia\par      Functional bowel d/o\par \par P:  get most recent labs from PCP\par Recommend: \par * Diet: High Fiber,  Low Fat & Lactose free, Low FODMAPs--was reviewed & emphasized\par * Daily fluid intake was reviewed : 6  --  8 cups of decaffeinated fluid daily was emphasized\par * Regular aerobic exercise was emphasized\par * Probiotics  1  daily\par * Miralax / Linzess/Amitiza--no required\par * Neuromodulation: reviewed but not required as yet \par Colonoscopy :P al;so for screening \par \par \par \par \par \par \par 1. Hemorrhoids:  well - controlled.  No pain,  swelling,  itch,  bleeding\par * Discussed the potential complications of thrombosis,  pain,  infection,  swelling, itching,  bleeding \par Reccomend: \par * High - Fiber Diet was reviewed and emphasized\par * 6  --  8 cups of decaffeinated fluid daily was emphasized \par * Sitz Bathes BID,  No:  Anusol HC  Suppos / Cream  SC BID -- was needed\par * No:  Tucks BID,   No:  Balneol Lotion,   No:  Calmoseptine Oint -- was needed ;    ++ Prep H prn\par * No:  need for  Colorectal surgical evaluation for possible ablation w Dr --  was emphasized\par \par \par \par \par \par \par 2. Colorectal Cancer Screening:  to be evaluated\par   Utilizing teaching posters and anatomical models the following were discussed and emphasized with the patient in detail: \par * Discussed the pre-malignant potential of polyps\par * Discussed the importance of f/u surveillance / screening colonoscopy\par * High-Fiber Diet was reviewed and emphasized\par * Anti-oxidants and ASA/NSAID Therapy reviewed\par * Given age > 40-49 yo\par * Recommend Colonoscopy\par * R/O  Colonic Neoplasia\par \par \par \par \par Informed Consent:\par * The risks & Benefits of  Colonoscopy were discussed w patient.\par * This included but was not limited to perforation, bleeding, sedation /med rxns possibly requiring surgery, blood transfusions, antibiotics & CPR/Intubation.\par * Pt. understands & agrees to the procedures.\par The following instructions in regards to the prep and medically essential ( cardiac, pulmonary, sz, psych, endocrine)  pre-op medication administration\par was reviewed and emphasized with the patient . \par * Pt. advised to D/C  ASA/NSAIDs  7  Days  PTP.\par * [ +++ ]  Dulcolax / Miralax / Mag. Citrate ,  [     ] Prepopik/ Clenpiq ,  [     ] Osmo Prep,  [    ] GoLytely,  prep. reviewed w Pt.\par * Hold  [           ] AM of procedure.\par * Hold  [           ] PM of procedure.\par * Take  [           ] PM of procedure.\par * Take  [           ] AM of procedure.\par \par \par \par

## 2021-01-21 NOTE — REVIEW OF SYSTEMS
[Eye Pain] : no eye pain [Dysuria] : no dysuria [Skin Lesions] : no skin lesions [Confused] : no confusion [Suicidal] : not suicidal [Proptosis] : no proptosis [Easy Bruising] : no tendency for easy bruising

## 2021-01-21 NOTE — HISTORY OF PRESENT ILLNESS
[de-identified] : \par \par This HPI is  in a note Titled:  Non - Appointment   and reflects a summary and review of records : including previous and most recent  Labs, body imaging, consults and progress notes, operative and pathology reports, EKG reports, ED records, found in [employer] and/or [facility] [employer]\par \par \par \par PCP: Jonatan\par \par 54 yo M w h/o HTN, Cervical/ Lumbar Disc dis w radiculopathy, Degen Disc/ arthritis of Thoracic spine \par Hemorrhoids\par   Most recent labs  and imaging reviewed w patient:\par \par Over last several months c/o L groin pain, had a psoas injection and felt better\par now intermittent LLQ pain: dull ache, incr w sitting, decreases w relaxing, stretching\par No change in bowel habits, Usu # 4, 3x/D, no blood or mucous\par Today: Feeling well, no c/o , SOB/ SANTOS, Cough, Wheeze, Palpitations, edema\par \par  * Abd pain—Intermittent \par * Nausea—no\par * Vomit—no\par * Belching—no\par * Regurgitation—no\par * Ht burn—no\par * Throat Clearing—no\par * Globus—no\par * Cough—no\par * Hoarseness—no\par * Dysphagia—no\par * BMs: # 4 , 3x/d \par * Constipation—no\par * Diarrhea—no\par * Bloating—no\par * Flatulence—no\par * Gurgling—no\par * Melena—no\par * BPBPR—no\par * Anorexia—no\par * Wt. Loss-no\par \par

## 2021-02-09 ENCOUNTER — APPOINTMENT (OUTPATIENT)
Dept: GASTROENTEROLOGY | Facility: CLINIC | Age: 56
End: 2021-02-09

## 2021-02-16 ENCOUNTER — RESULT REVIEW (OUTPATIENT)
Age: 56
End: 2021-02-16

## 2021-02-18 ENCOUNTER — RESULT REVIEW (OUTPATIENT)
Age: 56
End: 2021-02-18

## 2021-02-19 ENCOUNTER — APPOINTMENT (OUTPATIENT)
Dept: GASTROENTEROLOGY | Facility: HOSPITAL | Age: 56
End: 2021-02-19

## 2021-05-11 ENCOUNTER — APPOINTMENT (OUTPATIENT)
Dept: CARDIOLOGY | Facility: CLINIC | Age: 56
End: 2021-05-11

## 2021-05-20 ENCOUNTER — RESULT REVIEW (OUTPATIENT)
Age: 56
End: 2021-05-20

## 2021-05-20 ENCOUNTER — APPOINTMENT (OUTPATIENT)
Dept: HEMATOLOGY ONCOLOGY | Facility: CLINIC | Age: 56
End: 2021-05-20
Payer: COMMERCIAL

## 2021-05-20 VITALS
WEIGHT: 192 LBS | SYSTOLIC BLOOD PRESSURE: 107 MMHG | OXYGEN SATURATION: 98 % | HEIGHT: 68.7 IN | HEART RATE: 87 BPM | TEMPERATURE: 97.2 F | BODY MASS INDEX: 28.77 KG/M2 | DIASTOLIC BLOOD PRESSURE: 78 MMHG | RESPIRATION RATE: 16 BRPM

## 2021-05-20 PROCEDURE — 99205 OFFICE O/P NEW HI 60 MIN: CPT

## 2021-05-20 PROCEDURE — 99072 ADDL SUPL MATRL&STAF TM PHE: CPT

## 2021-05-20 RX ORDER — ASPIRIN 81 MG/1
81 TABLET ORAL
Refills: 0 | Status: ACTIVE | COMMUNITY

## 2021-05-20 NOTE — REVIEW OF SYSTEMS
[Palpitations] : palpitations [Negative] : Allergic/Immunologic [FreeTextEntry5] : loop recorder: once in a while  [de-identified] : left side neglect due to stroke

## 2021-05-20 NOTE — HISTORY OF PRESENT ILLNESS
[de-identified] : Mr. Josemanuel Minaya is 56 year old male with h/o HTN, HLD, cervical/lumbar herniated discs with recent right MCA infarction here for further evaluation. \par \par Patient had COVID in 2020 but the cardiologist R/O symptoms of COVID residues. \par On 2021, he flew to [place] to visit family and came back on the . On the  he was admitted to the hospital for a stroke.  \par Patient presented to the [facility] on 21 after being found on the floor by his wife and noted to have altered mental status.  He said he fell and tried to get up several times; he hit head and right side of chest, but no LOC.  \par \par Work up with 21 CT of the head was done which noted acute infarction in the right MCA distribution, left external capsule lacunar infarct, age-indeterminate.  CTA of the head and neck noted severe stenosis or occlusion of the inferior division of the M3 segment of the right middle cerebral artery.  Patient was evaluated by Dr. Mosquera of Neurology who recommended to continue on aspirin, high-dose Lipitor and MRI of brain (21) noted acute, large, right MCA distribution infarct, predominantly cortical base with developing mass-effect with 6 mm right to left midline shift and petechial blood product. \par \par Echocardiogram noted an ejection fraction of 66%, grade 1/4 diastolic dysfunction and PFO was not evident. Lower ext Doppler no DVT noted however patient was sluggish flow.\par On examination patient noted to have mild left facial weakness with left hemineglect which remained stable throughout the hospital course.  Rest of hospital course was otherwise uncomplicated.  Patient was then discharged to  [facility] on 5/10/21 with improved left side weakness and speech. \par \par \par FHx: \par SHx: knee surgeries\par          ACL/LCL\par          Varicose: Burst blood vessel in the scrotal area. \par \par Family hx:\par Aunt: TIA \par Maternal Grandmother: 2x strokes, . \par Mother: TIA\par Father: cardiac issues\par Paternal Grandmother: aortic crystalization\par \par Social Hx:\par Smoker: N/A\par Drinker: rarely \par Illicit: N/A\par

## 2021-05-20 NOTE — ASSESSMENT
[FreeTextEntry1] : Right MCA Stroke\par Unprovoked\par CT angiogram- Severe stenosis or occlusion of the inferior division of the M3 segment of the rt MCA.\par Echocardiogram with bubble- No PFO\par Has a loop recorder placed by Dr Weaver\par COVID 8/2020 - fever, cough, loss of taste/smell\par COVID AB x 2 very high during the admission. 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

## 2021-05-25 ENCOUNTER — APPOINTMENT (OUTPATIENT)
Dept: CARDIOLOGY | Facility: CLINIC | Age: 56
End: 2021-05-25
Payer: COMMERCIAL

## 2021-05-25 ENCOUNTER — NON-APPOINTMENT (OUTPATIENT)
Age: 56
End: 2021-05-25

## 2021-05-25 VITALS
SYSTOLIC BLOOD PRESSURE: 110 MMHG | BODY MASS INDEX: 28.77 KG/M2 | WEIGHT: 192 LBS | DIASTOLIC BLOOD PRESSURE: 70 MMHG | HEIGHT: 68.7 IN

## 2021-05-25 PROCEDURE — 93000 ELECTROCARDIOGRAM COMPLETE: CPT

## 2021-05-25 PROCEDURE — 99214 OFFICE O/P EST MOD 30 MIN: CPT

## 2021-05-25 PROCEDURE — 99072 ADDL SUPL MATRL&STAF TM PHE: CPT

## 2021-05-25 NOTE — ASSESSMENT
[FreeTextEntry1] : EKG May 2021 normal sinus rhythm\par A 2-D echocardiogram EF 66%, grade 1 diastolic dysfunction May 2021\par No PFO\par \par Treadmill stress test March 2020 exercise capacity very good at greater than 10 mets - however Hypertensive response \par \par CTA May 2021 severe stenosis or occlusion of the inferior division of the anterior segment of the right middle cerebral artery. normal CT of extracranial circulation. No evidence of carotid stenosis\par Patient underwent hypercoagulable workup with hematology\par Theoretically patient had hypercoagulable state post Covid\par No obvious indication for anticoagulation at this time\par I agree with continuing aspirin and statin therapy\par When patient is done with atorvastatin 80 mg will recommend lipid panel and possible lowering of atorvastatin 40 mg\par Followup in one month\par \par Considerable loop recorder Biotronik\par Stitch removed\par \par Total time 25 minutes\par \par wife present.

## 2021-05-25 NOTE — HISTORY OF PRESENT ILLNESS
[FreeTextEntry1] : 56-year-old male status post coated who comes in with MCA stroke\par 2-D echocardiogram revealed normal ejection fraction no PFO noted\par Lower extremity Doppler no DVT however it was noted that flow was sluggish. Patient was discharged on aspirin and statin\par He takes Ramipril 2.5 mg for hypertension\par Status post Biotronik implantable loop recorder

## 2021-06-08 ENCOUNTER — APPOINTMENT (OUTPATIENT)
Dept: NEUROLOGY | Facility: CLINIC | Age: 56
End: 2021-06-08
Payer: COMMERCIAL

## 2021-06-08 VITALS
DIASTOLIC BLOOD PRESSURE: 99 MMHG | SYSTOLIC BLOOD PRESSURE: 141 MMHG | HEART RATE: 75 BPM | TEMPERATURE: 97.1 F | WEIGHT: 195 LBS | HEIGHT: 69 IN | BODY MASS INDEX: 28.88 KG/M2 | OXYGEN SATURATION: 95 %

## 2021-06-08 PROCEDURE — 99072 ADDL SUPL MATRL&STAF TM PHE: CPT

## 2021-06-08 PROCEDURE — 99215 OFFICE O/P EST HI 40 MIN: CPT

## 2021-06-08 NOTE — PHYSICAL EXAM
[FreeTextEntry1] : Neuro Exam\par MS: AAOx3, follows commands, good comprehension, fund of knowledge appears intact, no aphasia, no dysarthria\par CN:  PERRL, EOMI, peripheral vision intact, v1-v3 intact, hearing intact, no facial asymmetry, tongue & uvula midline, shoulder shrug equal strength\par Motor:  5/5 no drift, no rigidity, no abnormal atrophy\par Sensory: Lt/PP intact\par Coord: no tremors\par DTR: 1+\par \par NIHSS= 0, mRS = 0

## 2021-06-08 NOTE — ASSESSMENT
[FreeTextEntry1] : Pt is 55 yo RH M with recent stroke here for hospital follow up\par \par - pt with no deficits, NIHSS = 0\par - reports intermittent anxiety>mild depression sensation. Suggested psychiatry evaluation given the long duration it can take for treatment to kick in\par - cont with aspirin 81mg daily\par - lipitor 80mg daily. lipid panel in 3 month\par - pending full hypercoagulable work up\par - pt on loop recording\par - follow up with me in 3 month\par \par

## 2021-06-08 NOTE — DATA REVIEWED
[de-identified] : MRI head (5/8/21)  Acute, large, right MCA distribution infarct, predominantly cortically base, \par compatible with clinical findings.  Developing mass effect with 6 mm right to \par left midline shift.  Petechial blood product. [de-identified] : CTA head and neck (5/7/21): Severe stenosis or occlusion of the inferior division of the M3 segment of the right middle cerebral artery. The Normal CTA of the extracranial circulation. No evidence of carotid stenosis.\par \par ECHO (5/10/21) \par  Normal left ventricular size, systolic function and wall thickness, with no  \par  regional wall motion abnormalities.  \par  Grade I/IV diastolic dysfunction (abnormal relaxation filling pattern), normal  \par  to mildly elevated filling pressures.  \par  Left ventricular ejection fraction is calculated using Biplane Modified  \par  Edmondson's method at 66 %.   \par   No evidence of pfo based on negative agitated saline study.  \par  Normal ascending aorta dimension.   \par  Compared with study dated March 2020, no significant change.  \par

## 2021-06-08 NOTE — HISTORY OF PRESENT ILLNESS
[FreeTextEntry1] : Pt is 55 yo RH M with recent stroke here for hospital follow up\par \par Pt seen in the office. prior chart reviewed\par \par Pt went to [facility] on 5/7/21 after presenting with dysarthria, facial weakness, and gait difficulty. pt was last seen normal the night before around 8pm.  \par \par On arrival to [facility], pt noted with subtle left side weakness, neglect, and dysarthria (NIHSS 5). ct with no acute finding, cta with small right M3 occlusion, and ctp showing infarct core of about 16cc, tmax 47 cc (mismatch 31cc)\par \par no tpa was given since pt arrived outside tx window, and no LVO so thrombectomy was done\par \par Pt made slow recovery and was eventually discharged to rehab\par \par Since discharge, pt has been on asprin 81mg daily, Lipitor 80mg daily\par pt was seen by hematology for hypercoagulable work up (results pending, so far , no evidenece of hypercogulable state\par pt seen by cardiology: negative for pfo, s/o loop monitor placement\par \par pt reports some difficulty focusing (for example, cannot watch movie/tv for long time), intermittently feels low energy/decreased motivation and anxious. pt has been slowing increasing physical activity (previously actively playing soccer), started some physical activity (?paddle ball) which is helping him fee less anxious. During the actibity, however, pt notices that he is slightly incoordinated/slow to respond with right hand when he crosses that midline, and also slightly more incoordinated on the left side.

## 2021-06-10 ENCOUNTER — APPOINTMENT (OUTPATIENT)
Dept: HEMATOLOGY ONCOLOGY | Facility: CLINIC | Age: 56
End: 2021-06-10
Payer: COMMERCIAL

## 2021-06-10 PROCEDURE — 99214 OFFICE O/P EST MOD 30 MIN: CPT | Mod: 95

## 2021-06-10 NOTE — REVIEW OF SYSTEMS
[FreeTextEntry5] : loop recorder: once in a while  [de-identified] : left side neglect due to stroke

## 2021-06-10 NOTE — ASSESSMENT
[FreeTextEntry1] : Right MCA Stroke\par Unprovoked\par CT angiogram- Severe stenosis or occlusion of the inferior division of the M3 segment of the rt MCA.\par Echocardiogram with bubble- No PFO\par Has a loop recorder placed by Dr Weaver\par COVID 8/2020 - fever, cough, loss of taste/smell\par COVID AB x 2 very high during the admission. PCR Negative\par Has not had the COVID vaccine as yet\par HO multiple concussions - played soccer\par Non smoker\par Extensive family ho CAD and CVA but he is the youngest to get stroke\par \par Discussed about various thrombophilias work up finding\par Essentially negative except for slightly low AT3\par His wife declines that he got any heparin or lovenox while he was hospitalized with stroke\par Repeat levels in 3 months to confirm persistent findings\par In the meanwhile, take precautions to avoid blood clot which were discussed \par COntinue ASA, lipitor as recommended by neurology\par \par He plans to fly back to [place] in july 2021\par Advised to take eliquis 2.5 mg prior to boarding the flight to and fro\par \par Cancer screening\par 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 CBC, CMP, D DImer, Antithrombin 3\par OV few days later\par

## 2021-06-10 NOTE — RESULTS/DATA
[FreeTextEntry1] : Labs reviewed, analyzed and discussed\par \par Factor V Leiden mutation, Prothrombin Gene mutation, APS panel, Protein S - negative\par \par Antithrombin 3- 79% (normal %)\par

## 2021-06-10 NOTE — HISTORY OF PRESENT ILLNESS
[Home] : at home, [unfilled] , at the time of the visit. [Medical Office: ([facility])___] : at the medical office located in  [Spouse] : spouse [Verbal consent obtained from patient] : the patient, [unfilled] [de-identified] : Mr. Josemanuel Minaya is 56 year old male with h/o HTN, HLD, cervical/lumbar herniated discs with recent right MCA infarction here for further evaluation. \par \par Patient had COVID in 2020 but the cardiologist R/O symptoms of COVID residues. \par On 2021, he flew to [place] to visit family and came back on the . On the  he was admitted to the hospital for a stroke.  \par Patient presented to the [facility] on 21 after being found on the floor by his wife and noted to have altered mental status.  He said he fell and tried to get up several times; he hit head and right side of chest, but no LOC.  \par \par Work up with 21 CT of the head was done which noted acute infarction in the right MCA distribution, left external capsule lacunar infarct, age-indeterminate.  CTA of the head and neck noted severe stenosis or occlusion of the inferior division of the M3 segment of the right middle cerebral artery.  Patient was evaluated by Dr. Mosquera of Neurology who recommended to continue on aspirin, high-dose Lipitor and MRI of brain (21) noted acute, large, right MCA distribution infarct, predominantly cortical base with developing mass-effect with 6 mm right to left midline shift and petechial blood product. \par \par Echocardiogram noted an ejection fraction of 66%, grade 1/4 diastolic dysfunction and PFO was not evident. Lower ext Doppler no DVT noted however patient was sluggish flow.\par On examination patient noted to have mild left facial weakness with left hemineglect which remained stable throughout the hospital course.  Rest of hospital course was otherwise uncomplicated.  Patient was then discharged to  [facility] on 5/10/21 with improved left side weakness and speech. \par \par \par FHx: \par SHx: knee surgeries\par          ACL/LCL\par          Varicose: Burst blood vessel in the scrotal area. \par \par Family hx:\par Aunt: TIA \par Maternal Grandmother: 2x strokes, . \par Mother: TIA\par Father: cardiac issues\par Paternal Grandmother: aortic crystalization\par \par Social Hx:\par Smoker: N/A\par Drinker: rarely \par Illicit: N/A\par  [de-identified] : Telemedicine (video, audio) done today for follow up\par Consent obtained from the patient\par \par Overall feels well\par No new symptoms\par COntinue to be on ASA

## 2021-06-11 ENCOUNTER — APPOINTMENT (OUTPATIENT)
Dept: HEMATOLOGY ONCOLOGY | Facility: CLINIC | Age: 56
End: 2021-06-11
Payer: COMMERCIAL

## 2021-06-25 ENCOUNTER — APPOINTMENT (OUTPATIENT)
Dept: CARDIOLOGY | Facility: CLINIC | Age: 56
End: 2021-06-25
Payer: COMMERCIAL

## 2021-06-25 VITALS
DIASTOLIC BLOOD PRESSURE: 72 MMHG | SYSTOLIC BLOOD PRESSURE: 114 MMHG | OXYGEN SATURATION: 97 % | WEIGHT: 194 LBS | HEART RATE: 55 BPM | BODY MASS INDEX: 28.73 KG/M2 | RESPIRATION RATE: 16 BRPM | HEIGHT: 69 IN

## 2021-06-25 PROCEDURE — 99213 OFFICE O/P EST LOW 20 MIN: CPT

## 2021-06-25 PROCEDURE — 99072 ADDL SUPL MATRL&STAF TM PHE: CPT

## 2021-06-25 NOTE — HISTORY OF PRESENT ILLNESS
[FreeTextEntry1] : 56-year-old male status post coated who comes in with MCA stroke\par 2-D echocardiogram revealed normal ejection fraction no PFO noted\par Lower extremity Doppler no DVT however it was noted that flow was sluggish. Patient was discharged on aspirin and statin\par He takes Ramipril 2.5 mg for hypertension\par Status post Biotronik implantable loop recorder\par \par Since last visit no new complaints is on Ramipril 2.5 mg.

## 2021-06-25 NOTE — PHYSICAL EXAM
[Well Developed] : well developed [Well Nourished] : well nourished [No Acute Distress] : no acute distress [Normal Conjunctiva] : normal conjunctiva [Normal Venous Pressure] : normal venous pressure [No Carotid Bruit] : no carotid bruit [Normal S1, S2] : normal S1, S2 [No Murmur] : no murmur [No Rub] : no rub [No Gallop] : no gallop [Clear Lung Fields] : clear lung fields [Good Air Entry] : good air entry [No Respiratory Distress] : no respiratory distress  [Soft] : abdomen soft [Non Tender] : non-tender [No Masses/organomegaly] : no masses/organomegaly [Normal Bowel Sounds] : normal bowel sounds [Normal Gait] : normal gait [No Edema] : no edema [No Cyanosis] : no cyanosis [No Clubbing] : no clubbing [No Varicosities] : no varicosities [No Rash] : no rash [No Skin Lesions] : no skin lesions [Moves all extremities] : moves all extremities [Normal Speech] : normal speech [No Focal Deficits] : no focal deficits [Alert and Oriented] : alert and oriented [Normal memory] : normal memory

## 2021-06-25 NOTE — ASSESSMENT
[FreeTextEntry1] : EKG May 2021 normal sinus rhythm\par A 2-D echocardiogram EF 66%, grade 1 diastolic dysfunction May 2021\par No PFO\par \par Treadmill stress test March 2020 exercise capacity very good at greater than 10 mets - however Hypertensive response \par \par CTA May 2021 severe stenosis or occlusion of the inferior division of the anterior segment of the right middle cerebral artery. normal CT of extracranial circulation. No evidence of carotid stenosis\par Patient underwent hypercoagulable workup with hematology\par Theoretically patient had hypercoagulable state post Covid\par No obvious indication for anticoagulation at this time\par I agree with continuing aspirin and statin therapy\par When patient is done with atorvastatin 80 mg will recommend lipid panel and possible lowering of atorvastatin 40 mg\par Followup in one month\par \par loop recorder Biotronik\par \par blood work today\par continue statin and Ramipril. \par video in 6 weeks.\par \par Total time 25 minutes\par \par wife present.

## 2021-07-26 LAB
25(OH)D3 SERPL-MCNC: 25.4 NG/ML
ALBUMIN SERPL ELPH-MCNC: 4.4 G/DL
ALP BLD-CCNC: 76 U/L
ALT SERPL-CCNC: 31 U/L
ANION GAP SERPL CALC-SCNC: 11 MMOL/L
AST SERPL-CCNC: 27 U/L
BASOPHILS # BLD AUTO: 0.04 K/UL
BASOPHILS NFR BLD AUTO: 0.7 %
BILIRUB SERPL-MCNC: 0.8 MG/DL
BUN SERPL-MCNC: 12 MG/DL
CALCIUM SERPL-MCNC: 9.7 MG/DL
CHLORIDE SERPL-SCNC: 104 MMOL/L
CHOLEST SERPL-MCNC: 139 MG/DL
CO2 SERPL-SCNC: 26 MMOL/L
CREAT SERPL-MCNC: 0.87 MG/DL
EOSINOPHIL # BLD AUTO: 0.13 K/UL
EOSINOPHIL NFR BLD AUTO: 2.2 %
GLUCOSE SERPL-MCNC: 100 MG/DL
HCT VFR BLD CALC: 49.1 %
HDLC SERPL-MCNC: 34 MG/DL
HGB BLD-MCNC: 16.2 G/DL
IMM GRANULOCYTES NFR BLD AUTO: 0.3 %
LDLC SERPL CALC-MCNC: 84 MG/DL
LYMPHOCYTES # BLD AUTO: 2.53 K/UL
LYMPHOCYTES NFR BLD AUTO: 43 %
MAN DIFF?: NORMAL
MCHC RBC-ENTMCNC: 30.4 PG
MCHC RBC-ENTMCNC: 33 GM/DL
MCV RBC AUTO: 92.1 FL
MONOCYTES # BLD AUTO: 0.59 K/UL
MONOCYTES NFR BLD AUTO: 10 %
NEUTROPHILS # BLD AUTO: 2.57 K/UL
NEUTROPHILS NFR BLD AUTO: 43.8 %
NONHDLC SERPL-MCNC: 105 MG/DL
PLATELET # BLD AUTO: 212 K/UL
POTASSIUM SERPL-SCNC: 4.5 MMOL/L
PROT SERPL-MCNC: 7.3 G/DL
RBC # BLD: 5.33 M/UL
RBC # FLD: 13.4 %
SODIUM SERPL-SCNC: 141 MMOL/L
T4 SERPL-MCNC: 6.6 UG/DL
TRIGL SERPL-MCNC: 102 MG/DL
TSH SERPL-ACNC: 1.21 UIU/ML
WBC # FLD AUTO: 5.88 K/UL

## 2021-07-30 ENCOUNTER — APPOINTMENT (OUTPATIENT)
Dept: CARDIOLOGY | Facility: CLINIC | Age: 56
End: 2021-07-30
Payer: COMMERCIAL

## 2021-07-30 PROCEDURE — 99214 OFFICE O/P EST MOD 30 MIN: CPT | Mod: 95

## 2021-07-30 NOTE — ASSESSMENT
[FreeTextEntry1] : EKG May 2021 normal sinus rhythm\par A 2-D echocardiogram EF 66%, grade 1 diastolic dysfunction May 2021\par No PFO\par \par Treadmill stress test March 2020 exercise capacity very good at greater than 10 mets - however Hypertensive response \par \par CTA May 2021 severe stenosis or occlusion of the inferior division of the anterior segment of the right middle cerebral artery. normal CT of extracranial circulation. No evidence of carotid stenosis\par Patient underwent hypercoagulable workup with hematology\par Theoretically patient had hypercoagulable state post Covid\par No obvious indication for anticoagulation at this time\par I agree with continuing aspirin and statin therapy\par When patient is done with atorvastatin 80 mg will recommend lipid panel and possible lowering of atorvastatin 40 mg\par Followup in one month\par \par loop recorder Biotronik reviewed July 2021 - no afib. \par \par reviewed labs with patient and wife\par lower atorvastatin to 40 mg\par repeat labs in 6 weeks\par pt has no obvious contraindication for CBD.\par Pt prefers to stay on ramipril but has had asymptomatic hypotension\par recommend 1 week off ramipril and restart if bp > 130/90 mmhg. \par \par \par \par Followup patient\par Reason patient request contact:\par Conference took place on video conference on Doximity\par Consent was obtained from patient\par Time spent: 25 minutes > 50% of the time in the encounter in both counseling and coordination of care for any or all of the diagnosis submitted\par \par

## 2021-08-03 DIAGNOSIS — D68.59 OTHER PRIMARY THROMBOPHILIA: ICD-10-CM

## 2021-08-06 ENCOUNTER — RESULT REVIEW (OUTPATIENT)
Age: 56
End: 2021-08-06

## 2021-08-06 ENCOUNTER — APPOINTMENT (OUTPATIENT)
Dept: HEMATOLOGY ONCOLOGY | Facility: CLINIC | Age: 56
End: 2021-08-06
Payer: COMMERCIAL

## 2021-08-06 VITALS
HEIGHT: 69 IN | TEMPERATURE: 97.6 F | DIASTOLIC BLOOD PRESSURE: 81 MMHG | HEART RATE: 60 BPM | OXYGEN SATURATION: 98 % | WEIGHT: 192 LBS | RESPIRATION RATE: 18 BRPM | SYSTOLIC BLOOD PRESSURE: 124 MMHG | BODY MASS INDEX: 28.44 KG/M2

## 2021-08-06 DIAGNOSIS — R79.89 OTHER SPECIFIED ABNORMAL FINDINGS OF BLOOD CHEMISTRY: ICD-10-CM

## 2021-08-06 DIAGNOSIS — Z11.59 ENCOUNTER FOR SCREENING FOR OTHER VIRAL DISEASES: ICD-10-CM

## 2021-08-06 PROCEDURE — 99214 OFFICE O/P EST MOD 30 MIN: CPT

## 2021-08-06 NOTE — HISTORY OF PRESENT ILLNESS
[Home] : at home, [unfilled] , at the time of the visit. [Medical Office: ([facility])___] : at the medical office located in  [Spouse] : spouse [Verbal consent obtained from patient] : the patient, [unfilled] [de-identified] : Mr. Josemanuel Minaya is 56 year old male with h/o HTN, HLD, cervical/lumbar herniated discs with recent right MCA infarction here for further evaluation. \par \par Patient had COVID in 2020 but the cardiologist R/O symptoms of COVID residues. \par On 2021, he flew to [place] to visit family and came back on the . On the  he was admitted to the hospital for a stroke.  \par Patient presented to the [facility] on 21 after being found on the floor by his wife and noted to have altered mental status.  He said he fell and tried to get up several times; he hit head and right side of chest, but no LOC.  \par \par Work up with 21 CT of the head was done which noted acute infarction in the right MCA distribution, left external capsule lacunar infarct, age-indeterminate.  CTA of the head and neck noted severe stenosis or occlusion of the inferior division of the M3 segment of the right middle cerebral artery.  Patient was evaluated by Dr. Mosquera of Neurology who recommended to continue on aspirin, high-dose Lipitor and MRI of brain (21) noted acute, large, right MCA distribution infarct, predominantly cortical base with developing mass-effect with 6 mm right to left midline shift and petechial blood product. \par \par Echocardiogram noted an ejection fraction of 66%, grade 1/4 diastolic dysfunction and PFO was not evident. Lower ext Doppler no DVT noted however patient was sluggish flow.\par On examination patient noted to have mild left facial weakness with left hemineglect which remained stable throughout the hospital course.  Rest of hospital course was otherwise uncomplicated.  Patient was then discharged to  [facility] on 5/10/21 with improved left side weakness and speech. \par \par \par FHx: \par SHx: knee surgeries\par          ACL/LCL\par          Varicose: Burst blood vessel in the scrotal area. \par \par Family hx:\par Aunt: TIA \par Maternal Grandmother: 2x strokes, . \par Mother: TIA\par Father: cardiac issues\par Paternal Grandmother: aortic crystalization\par \par Social Hx:\par Smoker: N/A\par Drinker: rarely \par Illicit: N/A\par  [de-identified] : Patient is seen today for follow up\par \par He has good days, bad days\par Has mood swings. Has stress which sometimes keep him awake at night\par Scheduled to see neuropsych\par \par Continue to be on ASA 81 mg\par \par He plans to go to [facility] in September\par He took eliquis prior to his flight in [place] and did well.\par \par \par

## 2021-08-06 NOTE — REVIEW OF SYSTEMS
[Palpitations] : palpitations [Negative] : Allergic/Immunologic [FreeTextEntry5] : loop recorder: once in a while  [de-identified] : left side neglect due to stroke

## 2021-08-06 NOTE — ASSESSMENT
[FreeTextEntry1] : Right MCA Stroke\par Unprovoked\par CT angiogram- Severe stenosis or occlusion of the inferior division of the M3 segment of the rt MCA.\par Echocardiogram with bubble- No PFO\par Has a loop recorder placed by Dr Weaver\par COVID 8/2020 - fever, cough, loss of taste/smell\par COVID AB x 2 very high during the admission. PCR Negative\par Has not had the COVID vaccine as yet\par HO multiple concussions - played soccer\par Non smoker\par Extensive family ho CAD and CVA but he is the youngest to get stroke\par Thrombophilias work up essentially negative except for slightly low AT3\par Repeat levels sent today\par Patient will call to discuss findings/results in a few days\par COntinue ASA, lipitor as recommended by neurology\par Consider eliquis 2.5 mg prior to long distance flights, road trips\par \par Elevated D Dimer\par No signs and symptoms of VTE\par Doppler USG to rule out DVT\par \par Cancer screening\par 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

## 2021-08-09 ENCOUNTER — RESULT REVIEW (OUTPATIENT)
Age: 56
End: 2021-08-09

## 2021-08-13 ENCOUNTER — APPOINTMENT (OUTPATIENT)
Dept: NEUROLOGY | Facility: CLINIC | Age: 56
End: 2021-08-13
Payer: COMMERCIAL

## 2021-08-13 VITALS
DIASTOLIC BLOOD PRESSURE: 81 MMHG | SYSTOLIC BLOOD PRESSURE: 119 MMHG | HEART RATE: 80 BPM | HEIGHT: 69 IN | BODY MASS INDEX: 27.85 KG/M2 | WEIGHT: 188 LBS | OXYGEN SATURATION: 95 %

## 2021-08-13 PROCEDURE — 99215 OFFICE O/P EST HI 40 MIN: CPT

## 2021-08-13 NOTE — ASSESSMENT
[FreeTextEntry1] : Pt with hx of right mca distribution stroke, with residual difficulty with focus, complex tasks, probable depression. \par \par \par -  recent neuropsych eval reviewed: Pt with weakness in : speed of information processing, auditory attention, and spatial-organizational skills. Behaviorally, pt also with impulsivity\par - spoke at length with pt and his wife. Prior mri image reviewed.  Explained to the pt that he had a large area  stroke with minimal outward/symptomatic presentation, but cognitive problems can be expected from the extent of the damage. Suggested a trial of antidepressant: fluoxetine 20mg daily prescribed\par - psych eval\par - cont with aspirin and statins\par - loop recording\par - bp goal: normotensive\par - follow up after psych eval.\par

## 2021-08-13 NOTE — PHYSICAL EXAM
[FreeTextEntry1] : Neuro Exam\par MS: AAOx3, follows commands, good comprehension, fund of knowledge appears intact, no aphasia, no dysarthria\par CN:  PERRL, EOMI, peripheral vision intact, v1-v3 intact, hearing intact, no facial asymmetry, tongue & uvula midline, shoulder shrug equal strength\par Motor:  5/5 no drift, no rigidity, no abnormal atrophy\par Sensory: Lt/PP intact\par Coord: no tremors\par DTR: 2+\par \par NIHSS = 0, mRS = 0

## 2021-08-13 NOTE — HISTORY OF PRESENT ILLNESS
[FreeTextEntry1] : Pt is 57 yo RH M with hx of stroke here for hospital follow up\par \par Pt seen in the office along with pt's wife. \par \par Pt went to [facility] on 5/7/21 after presenting with dysarthria, facial weakness, and gait difficulty. pt was last seen normal the night before around 8pm.  At the time,  pt noted with subtle left side weakness, neglect, and dysarthria (NIHSS 5). ct with no acute finding, cta with small right M3 occlusion (no carotid stenosis), and ctp showing infarct core of about 16cc, tmax 47 cc (mismatch 31cc)\par \par no tpa was given since pt arrived outside tx window, and no LVO so thrombectomy was not done.  MRI head done immediately done after did not show T2/dwi mismatch.\par \par Pt made slow recovery and was eventually discharged to rehab\par \par Since discharge, pt has been on aspirin 81mg daily, Lipitor 80mg daily\par \par pt was seen by hematology for hypercoagulable work up (results pending, so far , no evidence of hypercoagulable state\par pt seen by cardiology: negative for pfo, s/p loop monitor placement\par \par Pt seen today: as with prior visit, pt continue to have some difficulty focusing, doing complex tasks.  Pt also feels that his stamina (mentally) is down, and as a results, feels exhausted after routine exercise that he was able to do previously.\par \par Recently, pt took a vacation: pt was relaxed majority of the time, but one night, pt made a "black joke" about jumping off the balcony.  He meant it as a joke, but perseverated on the thought, became concerned enough to the point where he needed to barricade the balcony window.  \par \par Previously, pt reported periods of low energy and dec motivation and suggested to consider anti-depressant and/or consider psychiatry eval.  Pt reports undergoing periods of depression after a bad divorce 5-6 years ago, and reports that current feeling is not close to the mood at the time (and therefore, has not pursued it yet)\par . \par Physically , pt has improved significantly, no deficits.

## 2021-08-13 NOTE — DATA REVIEWED
[de-identified] : MRI head (5/8/21)  Acute, large, right MCA distribution infarct, predominantly cortically base, \par compatible with clinical findings.  Developing mass effect with 6 mm right to \par left midline shift.  Petechial blood product. [de-identified] : CTA head and neck (5/7/21): Severe stenosis or occlusion of the inferior division of the M3 segment of the right middle cerebral artery. The Normal CTA of the extracranial circulation. No evidence of carotid stenosis.\par \par ECHO (5/10/21) \par  Normal left ventricular size, systolic function and wall thickness, with no  \par  regional wall motion abnormalities.  \par  Grade I/IV diastolic dysfunction (abnormal relaxation filling pattern), normal  \par  to mildly elevated filling pressures.  \par  Left ventricular ejection fraction is calculated using Biplane Modified  \par  Edmondson's method at 66 %.   \par   No evidence of pfo based on negative agitated saline study.  \par  Normal ascending aorta dimension.   \par  Compared with study dated March 2020, no significant change.  \par

## 2021-09-20 LAB
ALBUMIN SERPL ELPH-MCNC: 4.3 G/DL
ALP BLD-CCNC: 93 U/L
ALT SERPL-CCNC: 29 U/L
ANION GAP SERPL CALC-SCNC: 12 MMOL/L
AST SERPL-CCNC: 23 U/L
BILIRUB SERPL-MCNC: 0.5 MG/DL
BUN SERPL-MCNC: 11 MG/DL
CALCIUM SERPL-MCNC: 9.3 MG/DL
CHLORIDE SERPL-SCNC: 104 MMOL/L
CHOLEST SERPL-MCNC: 136 MG/DL
CO2 SERPL-SCNC: 24 MMOL/L
CREAT SERPL-MCNC: 0.9 MG/DL
GLUCOSE SERPL-MCNC: 91 MG/DL
HDLC SERPL-MCNC: 37 MG/DL
LDLC SERPL CALC-MCNC: 79 MG/DL
NONHDLC SERPL-MCNC: 100 MG/DL
POTASSIUM SERPL-SCNC: 4.5 MMOL/L
PROT SERPL-MCNC: 7 G/DL
SODIUM SERPL-SCNC: 140 MMOL/L
TRIGL SERPL-MCNC: 104 MG/DL

## 2021-09-24 ENCOUNTER — APPOINTMENT (OUTPATIENT)
Dept: CARDIOLOGY | Facility: CLINIC | Age: 56
End: 2021-09-24
Payer: COMMERCIAL

## 2021-09-24 PROCEDURE — 99213 OFFICE O/P EST LOW 20 MIN: CPT | Mod: 95

## 2021-09-24 NOTE — ASSESSMENT
[FreeTextEntry1] : EKG May 2021 normal sinus rhythm\par A 2-D echocardiogram EF 66%, grade 1 diastolic dysfunction May 2021\par No PFO\par \par Treadmill stress test March 2020 exercise capacity very good at greater than 10 mets - however Hypertensive response \par \par CTA May 2021 severe stenosis or occlusion of the inferior division of the anterior segment of the right middle cerebral artery. normal CT of extracranial circulation. No evidence of carotid stenosis\par Patient underwent hypercoagulable workup with hematology\par Theoretically patient had hypercoagulable state post Covid\par No obvious indication for anticoagulation at this time\par I agree with continuing aspirin and statin therapy\par When patient is done with atorvastatin 80 mg will recommend lipid panel and possible lowering of atorvastatin 40 mg\par \par loop recorder Biotronik reviewed July 2021 - no afib. \par \par reviewed labs with patient and wife\par lower atorvastatin to 40 mg\par repeat labs in 6 weeks\par pt has no obvious contraindication for CBD.\par Pt prefers to stay on ramipril but has had asymptomatic hypotension\par \par BP borderline controlled on ramipril 2.5 mg - no obvious indication to increase medication at this time as he has had episodes of hypotension in the past. \par sept 2021 - no afib. on biotronik monitor. \par \par \par \par Followup patient\par Reason patient request contact:\par Conference took place on video conference on Doximity\par Consent was obtained from patient\par Time spent: 25 minutes > 50% of the time in the encounter in both counseling and coordination of care for any or all of the diagnosis submitted\par \par

## 2021-09-24 NOTE — PHYSICAL EXAM
[Well Developed] : well developed [Well Nourished] : well nourished [No Acute Distress] : no acute distress [Normal Conjunctiva] : normal conjunctiva [Normal Venous Pressure] : normal venous pressure [No Carotid Bruit] : no carotid bruit [Normal S1, S2] : normal S1, S2 [No Murmur] : no murmur [No Rub] : no rub [No Gallop] : no gallop [Clear Lung Fields] : clear lung fields [Good Air Entry] : good air entry [No Respiratory Distress] : no respiratory distress  [Non Tender] : non-tender [Soft] : abdomen soft [No Masses/organomegaly] : no masses/organomegaly [Normal Bowel Sounds] : normal bowel sounds [Normal Gait] : normal gait [No Edema] : no edema [No Cyanosis] : no cyanosis [No Clubbing] : no clubbing [No Varicosities] : no varicosities [No Rash] : no rash [No Skin Lesions] : no skin lesions [Moves all extremities] : moves all extremities [Normal Speech] : normal speech [No Focal Deficits] : no focal deficits [Alert and Oriented] : alert and oriented [Normal memory] : normal memory

## 2021-09-24 NOTE — HISTORY OF PRESENT ILLNESS
[FreeTextEntry1] : 56-year-old male status post coated who comes in with MCA stroke\par 2-D echocardiogram revealed normal ejection fraction no PFO noted\par Lower extremity Doppler no DVT however it was noted that flow was sluggish. Patient was discharged on aspirin and statin\par He takes Ramipril 2.5 mg for hypertension\par Status post Biotronik implantable loop recorder\par \par Since last visit no new complaints is on Ramipril 2.5 mg. \par

## 2021-10-26 DIAGNOSIS — N52.9 MALE ERECTILE DYSFUNCTION, UNSPECIFIED: ICD-10-CM

## 2021-11-02 ENCOUNTER — NON-APPOINTMENT (OUTPATIENT)
Age: 56
End: 2021-11-02

## 2021-11-02 ENCOUNTER — APPOINTMENT (OUTPATIENT)
Dept: NEUROLOGY | Facility: CLINIC | Age: 56
End: 2021-11-02
Payer: COMMERCIAL

## 2021-11-02 ENCOUNTER — FORM ENCOUNTER (OUTPATIENT)
Age: 56
End: 2021-11-02

## 2021-11-02 VITALS
BODY MASS INDEX: 27.85 KG/M2 | DIASTOLIC BLOOD PRESSURE: 80 MMHG | TEMPERATURE: 97.2 F | HEIGHT: 69 IN | HEART RATE: 54 BPM | WEIGHT: 188 LBS | SYSTOLIC BLOOD PRESSURE: 133 MMHG

## 2021-11-02 PROCEDURE — 99215 OFFICE O/P EST HI 40 MIN: CPT

## 2021-11-02 RX ORDER — FLUOXETINE HYDROCHLORIDE 20 MG/1
20 CAPSULE ORAL DAILY
Qty: 30 | Refills: 2 | Status: DISCONTINUED | COMMUNITY
Start: 2021-08-13 | End: 2021-11-02

## 2021-11-02 RX ORDER — CYCLOBENZAPRINE HYDROCHLORIDE 5 MG/1
5 TABLET, FILM COATED ORAL 3 TIMES DAILY
Qty: 90 | Refills: 1 | Status: DISCONTINUED | COMMUNITY
Start: 2019-11-13 | End: 2021-11-02

## 2021-11-02 RX ORDER — GABAPENTIN 300 MG/1
300 CAPSULE ORAL 3 TIMES DAILY
Qty: 90 | Refills: 2 | Status: DISCONTINUED | COMMUNITY
Start: 2019-12-05 | End: 2021-11-02

## 2021-11-02 NOTE — DATA REVIEWED
[de-identified] : MRI brain showed acute right mca ischemic infarct\par CTA head showed abrupt cutoff of right M3 but no proximal artery stenosis

## 2021-11-02 NOTE — REVIEW OF SYSTEMS
[Negative] : Heme/Lymph [As Noted in HPI] : as noted in HPI [Confused or Disoriented] : confusion [Memory Lapses or Loss] : memory loss [Decr. Concentrating Ability] : decreased concentrating ability [Changed Thought Patterns] : changed thought patterns [Repeating Questions] : repeated questioning about recent events [Difficulty Writing] : difficulty writing [Abnormal Sensation] : an abnormal sensation [Hypersensitivity] : hypersensitivity

## 2021-11-02 NOTE — PHYSICAL EXAM
[FreeTextEntry1] : Mental status:\par Orientation: Alert , oriented to month, day of week, year, location                                                                           Speech is fluent , able to name objects, repeat a sentence and write a sentence                                                 Memory: Short term tested by registering  3 objects and recalled 2/3 in 5 min; Long term memory intact based on correct recall of past events and demographic details.  Able to draw a clock but could not draw a 3 dimensional cube.                                                                                                                                                        \par Cranial nerves:\par CN I deferred. CN  II VFF; fundus exam benign; III, IV, VI: PERRLA, EOM IV: Facial sensation normal B/L to touch, pinprick and temperatureVII:Facial strength normal B/L. VIII: Gross hearing intact IX, X: palate midline and elevates symmetrically XI: Trapezius normal strength, XII: Tongue midline without atrophy or fasciculation\par Motor: Muscle tone no rigidity or resistance in all 4 extremities. No atrophy or fasciculation. Muscle strength: arms and legs, proximal and distal flexors and extensors are normal 5/5 . No UE drift.\par Sensory: increased sensation to temperature and pp on rue \par Reflexes: all present but brisker in LUE 3+ an d2+ elesewhere \par Coordination: finger to nose: normal. Heel to shin: normal\par Gait: steady normal based ; Romberg test negative \par \par

## 2021-11-02 NOTE — ASSESSMENT
[FreeTextEntry1] : 56 yr old with minimal vascular risk factors sustained cryptogenic embolic right mca stroke with residual impairment in executive cognitive functioning and emotional lability.\par \par Since underling etiology for stroke still remains unknown would recommend proceeding KAUR to assure there is in fact no PFO, continue long term heart monitoring , screen for inflammatory markers that can play a role in vascular disease ( Lpa, ESR, CRP) \par \par I discussed change in personality at length with the patient and at this time he feels he is managing outburst better by avoiding triggers and would like to forgo any treatment at this time\par \par I suggested a trial of donepezil as well to see if it may help with cogntive symptoms he is experiencing post stroke\par \par Follow up plan in 2-3 weeks\par \par

## 2021-11-02 NOTE — HISTORY OF PRESENT ILLNESS
[FreeTextEntry1] : 56 year old male seen for second opinion for stroke prevention and behavior changes after stroke\par Patient sustained cryptogenic right mca embolic stroke on 5/7/21. Although mild RHP initially noticed has resolved he has residual impairment in higher cognitive functioning affecting his speed of processing, multitask, emotional lability, concentration. 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 He has been overwhelmed with reading email, using the computer and even typing. His frustration with these tasks has led to some emotional outbursts and even breaking furniture. He states he is learning how to prevent being "flooded" by removing himself from an aggravating situation. He admits that he loses his temper more readily than before . This has affected his interaction with his wife and children but feels the situation is improving.\par \par As for etiology for stroke it remains unknown. He has an ILR placed but thus far no Afib/flutter detected. He was noted to have mild low antithrombin 3 activity for which he is just on aspirin as per hematology and uses eliquis if taking a long flight. TTE did not show any valve abnormality or any indirect suggestion of an PFO .

## 2021-11-08 LAB
CRP SERPL HS-MCNC: 0.54 MG/L
ERYTHROCYTE [SEDIMENTATION RATE] IN BLOOD BY WESTERGREN METHOD: 12 MM/HR

## 2021-11-09 LAB — APO LP(A) SERPL-MCNC: <9 NMOL/L

## 2021-11-15 ENCOUNTER — APPOINTMENT (OUTPATIENT)
Dept: HEART AND VASCULAR | Facility: CLINIC | Age: 56
End: 2021-11-15
Payer: COMMERCIAL

## 2021-11-15 VITALS
HEIGHT: 69 IN | DIASTOLIC BLOOD PRESSURE: 85 MMHG | HEART RATE: 71 BPM | OXYGEN SATURATION: 98 % | WEIGHT: 185 LBS | TEMPERATURE: 98.8 F | BODY MASS INDEX: 27.4 KG/M2 | SYSTOLIC BLOOD PRESSURE: 130 MMHG

## 2021-11-15 PROCEDURE — 99214 OFFICE O/P EST MOD 30 MIN: CPT

## 2021-11-15 PROCEDURE — 99204 OFFICE O/P NEW MOD 45 MIN: CPT

## 2021-11-15 RX ORDER — APIXABAN 2.5 MG/1
2.5 TABLET, FILM COATED ORAL
Qty: 2 | Refills: 3 | Status: DISCONTINUED | COMMUNITY
Start: 2021-06-10 | End: 2021-11-15

## 2021-11-15 NOTE — REVIEW OF SYSTEMS
[Fever] : no fever [Chills] : no chills [Blurry Vision] : no blurred vision [Earache] : no earache [SOB] : no shortness of breath [Leg Claudication] : no intermittent leg claudication [Cough] : no cough [Abdominal Pain] : no abdominal pain [Urinary Frequency] : no change in urinary frequency [Joint Pain] : no joint pain [Rash] : no rash [Dizziness] : no dizziness [Convulsions] : no convulsions [Speech Disturbance] : speech disturbance [Confusion] : no confusion was observed [Memory Lapses Or Loss] : memory lapses or loss [Easy Bleeding] : no tendency for easy bleeding

## 2021-11-15 NOTE — CARDIOLOGY SUMMARY
[de-identified] : KAUR 10/2021- reviewed\par +PFO on bubbles [de-identified] : 5/7/21 CT of the head was done which noted acute infarction in the right MCA distribution, left external capsule lacunar infarct, age-indeterminate. CTA of the head and neck noted severe stenosis or occlusion of the inferior division of the M3 segment of the right middle cerebral artery. \par \par MRI of brain (5/7/21) noted acute, large, right MCA distribution infarct, predominantly cortical base with developing mass-effect with 6 mm right to left midline shift and petechial blood product.

## 2021-11-15 NOTE — REASON FOR VISIT
[Congenital Heart Disease] : congenital heart disease [Structural Heart and Valve Disease] : structural heart and valve disease [FreeTextEntry1] : 55 y/o M who presented with cryptogenic R. MCA embolic stroke on 5/7/2021. Pt left with residual cognitive functioning impairment and emotional lability. He was previously an , but no longer feels he can complete those tasks. He had an ILR placed in May, and no afib since. TTE showed normal LVEF, but KAUR showed +PFO on bubble study. \par He had a Hematology workup that was negative (except for slight AT3 levels low, but no plan for long term anticoagulation). Sent today for SHD consultation\par Clinically stable, no CP/SOB

## 2021-11-15 NOTE — ASSESSMENT
[FreeTextEntry1] : 55 y/o M with prior cryptogenic CVA and found to have PFO. KAUR reviewed; +PFO with bubbles, prominent eustachian ridge, thick IAS\par \par -RoPE score 7 (72% chance that stroke is due to PFO)\par -Heme workup complete, no hypercoag disorder requiring long term blood thinners (mild AT deficiency)\par -ILR in place and no afib (reviewed since 5/2021)\par -We discussed/benefits risks of PFO closure and we discussed current data/guidelines. After a lengthy discussion we agreed with PFO closure is indicated and pt/family agree to the procedure.

## 2021-11-18 ENCOUNTER — APPOINTMENT (OUTPATIENT)
Dept: NEUROLOGY | Facility: CLINIC | Age: 56
End: 2021-11-18
Payer: COMMERCIAL

## 2021-11-18 VITALS
SYSTOLIC BLOOD PRESSURE: 121 MMHG | DIASTOLIC BLOOD PRESSURE: 83 MMHG | WEIGHT: 185 LBS | HEIGHT: 69 IN | TEMPERATURE: 98.3 F | BODY MASS INDEX: 27.4 KG/M2 | HEART RATE: 71 BPM

## 2021-11-18 PROCEDURE — 99215 OFFICE O/P EST HI 40 MIN: CPT

## 2021-11-18 RX ORDER — ACETAMINOPHEN 325 MG/1
TABLET, FILM COATED ORAL
Refills: 0 | Status: DISCONTINUED | COMMUNITY
End: 2021-11-18

## 2021-11-18 NOTE — PHYSICAL EXAM
[FreeTextEntry1] : Mental status:\par Orientation: Alert , oriented to month, day of week, year, location                                                                           Speech is fluent , able to name objects, repeat a sentence and write a sentence                                                 Memory: Short term tested by registering  3 objects and recalled 2/3 in 5 min; Long term memory intact based on correct recall of past events and demographic details.  Able to draw a clock but could not draw a 3 dimensional cube.                                                                                                                                                        \par Cranial nerves:\par CN I deferred. CN  II VFF; fundus exam benign; III, IV, VI: PERRLA, EOM IV: Facial sensation normal B/L to touch, pinprick and temperature VII:Facial strength normal B/L. VIII: Gross hearing intact IX, X: palate midline and elevates symmetrically XI: Trapezius normal strength, XII: Tongue midline without atrophy or fasciculation\par Motor: Muscle tone no rigidity or resistance in all 4 extremities. No atrophy or fasciculation. Muscle strength: arms and legs, proximal and distal flexors and extensors are normal 5/5 . No UE drift.\par Sensory: increased sensation to temperature and pp on rue \par Reflexes: all present but brisker in LUE 3+ an 2+ elsewhere \par Coordination: finger to nose: normal. Heel to shin: normal\par Gait: steady normal based ; Romberg test negative \par \par

## 2021-11-18 NOTE — ASSESSMENT
[FreeTextEntry1] : 56 yr old with minimal vascular risk factors sustained cryptogenic embolic right mca stroke with residual impairment in executive cognitive functioning and emotional lability.\par We reviewed his vascular risk factors at this visit and LDL remain slightly greater than 70 but he has been taking lipitor in the AM so I suggested switching to evening and we will check again in one month\par We discussed PFO closure at length and recent trials in patients with cryptogenic embolic stroke has shown 50% relative risk reduction in stroke over 5 years. He will continue dual antiplatelet therapy as directed by Dr. Cardoza\par \par He is tolerating Depakote and I will check level and CMP at this visit. \par \par Patient to follow up in 4-6 weeks \par \par

## 2021-11-18 NOTE — HISTORY OF PRESENT ILLNESS
[FreeTextEntry1] : 56 year old male seen for second opinion for stroke prevention and behavior changes after stroke\par Patient sustained cryptogenic right mca embolic stroke on 5/7/21. Although mild RHP initially noticed has resolved he has residual impairment in higher cognitive functioning affecting his speed of processing, multitask, emotional lability, concentration. 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 He has been overwhelmed with reading email, using the computer and even typing. His frustration with these tasks has led to some emotional outbursts and even breaking furniture. He states he is learning how to prevent being "flooded" by removing himself from an aggravating situation. He admits that he loses his temper more readily than before . This has affected his interaction with his wife and children but feels the situation is improving.\par \par  He has an ILR placed but thus far no Afib/flutter detected. He was noted to have mild low antithrombin 3 activity for which he is just on aspirin as per hematology and uses eliquis if taking a long flight. TTE did not show any valve abnormality or any indirect suggestion of an PFO . \par \par After leaving my office on Nov 2 he noted involuntary stiffening of his right hand consistent with a partial seizure. He went to the ER and the symptoms had resolved. He was started on Depakote for seizure prevention which he has continued. Clinically he was back to his baseline and no new vascular event suspected. He had KAUR during his hospital stay that revealed a PFO. He was referred to Dr. Cardoza who plan to do PFO closure in December \par \par Patient reports no side effects with Depakote except feeling tired after initial  ingestion but no other complaints.

## 2021-11-18 NOTE — REVIEW OF SYSTEMS
[Negative] : Heme/Lymph [As Noted in HPI] : as noted in HPI [Confused or Disoriented] : confusion [Memory Lapses or Loss] : memory loss [Decr. Concentrating Ability] : decreased concentrating ability [Changed Thought Patterns] : changed thought patterns [Repeating Questions] : repeated questioning about recent events [Difficulty Writing] : difficulty writing [Abnormal Sensation] : an abnormal sensation [Hypersensitivity] : hypersensitivity [Feeling Tired] : feeling tired

## 2021-11-19 LAB — VALPROATE SERPL-MCNC: 103 UG/ML

## 2021-12-06 ENCOUNTER — RESULT REVIEW (OUTPATIENT)
Age: 56
End: 2021-12-06

## 2021-12-08 ENCOUNTER — NON-APPOINTMENT (OUTPATIENT)
Age: 56
End: 2021-12-08

## 2021-12-08 VITALS
DIASTOLIC BLOOD PRESSURE: 82 MMHG | HEIGHT: 69 IN | OXYGEN SATURATION: 96 % | RESPIRATION RATE: 16 BRPM | WEIGHT: 180.78 LBS | SYSTOLIC BLOOD PRESSURE: 136 MMHG | HEART RATE: 75 BPM | TEMPERATURE: 98 F

## 2021-12-09 ENCOUNTER — TRANSCRIPTION ENCOUNTER (OUTPATIENT)
Age: 56
End: 2021-12-09

## 2021-12-09 ENCOUNTER — INPATIENT (INPATIENT)
Facility: HOSPITAL | Age: 56
LOS: 0 days | Discharge: HOME | DRG: 274 | End: 2021-12-09
Attending: INTERNAL MEDICINE | Admitting: INTERNAL MEDICINE
Payer: COMMERCIAL

## 2021-12-09 VITALS — TEMPERATURE: 98 F

## 2021-12-09 LAB
ALBUMIN SERPL ELPH-MCNC: 3.6 G/DL — SIGNIFICANT CHANGE UP (ref 3.3–5)
ALBUMIN SERPL ELPH-MCNC: 4.5 G/DL — SIGNIFICANT CHANGE UP (ref 3.3–5)
ALP SERPL-CCNC: 52 U/L — SIGNIFICANT CHANGE UP (ref 40–120)
ALP SERPL-CCNC: 66 U/L — SIGNIFICANT CHANGE UP (ref 40–120)
ALT FLD-CCNC: 21 U/L — SIGNIFICANT CHANGE UP (ref 10–45)
ALT FLD-CCNC: 26 U/L — SIGNIFICANT CHANGE UP (ref 10–45)
ANION GAP SERPL CALC-SCNC: 14 MMOL/L — SIGNIFICANT CHANGE UP (ref 5–17)
ANION GAP SERPL CALC-SCNC: 8 MMOL/L — SIGNIFICANT CHANGE UP (ref 5–17)
APTT BLD: 32.1 SEC — SIGNIFICANT CHANGE UP (ref 27.5–35.5)
APTT BLD: 33.2 SEC — SIGNIFICANT CHANGE UP (ref 27.5–35.5)
AST SERPL-CCNC: 21 U/L — SIGNIFICANT CHANGE UP (ref 10–40)
AST SERPL-CCNC: 26 U/L — SIGNIFICANT CHANGE UP (ref 10–40)
BASOPHILS # BLD AUTO: 0.04 K/UL — SIGNIFICANT CHANGE UP (ref 0–0.2)
BASOPHILS NFR BLD AUTO: 0.5 % — SIGNIFICANT CHANGE UP (ref 0–2)
BILIRUB SERPL-MCNC: 0.3 MG/DL — SIGNIFICANT CHANGE UP (ref 0.2–1.2)
BILIRUB SERPL-MCNC: 0.5 MG/DL — SIGNIFICANT CHANGE UP (ref 0.2–1.2)
BLD GP AB SCN SERPL QL: NEGATIVE — SIGNIFICANT CHANGE UP
BUN SERPL-MCNC: 13 MG/DL — SIGNIFICANT CHANGE UP (ref 7–23)
BUN SERPL-MCNC: 13 MG/DL — SIGNIFICANT CHANGE UP (ref 7–23)
CALCIUM SERPL-MCNC: 8.6 MG/DL — SIGNIFICANT CHANGE UP (ref 8.4–10.5)
CALCIUM SERPL-MCNC: 9.4 MG/DL — SIGNIFICANT CHANGE UP (ref 8.4–10.5)
CHLORIDE SERPL-SCNC: 101 MMOL/L — SIGNIFICANT CHANGE UP (ref 96–108)
CHLORIDE SERPL-SCNC: 105 MMOL/L — SIGNIFICANT CHANGE UP (ref 96–108)
CK SERPL-CCNC: 129 U/L — SIGNIFICANT CHANGE UP (ref 30–200)
CO2 SERPL-SCNC: 24 MMOL/L — SIGNIFICANT CHANGE UP (ref 22–31)
CO2 SERPL-SCNC: 25 MMOL/L — SIGNIFICANT CHANGE UP (ref 22–31)
CREAT SERPL-MCNC: 0.81 MG/DL — SIGNIFICANT CHANGE UP (ref 0.5–1.3)
CREAT SERPL-MCNC: 0.91 MG/DL — SIGNIFICANT CHANGE UP (ref 0.5–1.3)
EOSINOPHIL # BLD AUTO: 0.32 K/UL — SIGNIFICANT CHANGE UP (ref 0–0.5)
EOSINOPHIL NFR BLD AUTO: 3.7 % — SIGNIFICANT CHANGE UP (ref 0–6)
GAS PNL BLDA: SIGNIFICANT CHANGE UP
GLUCOSE BLDC GLUCOMTR-MCNC: 109 MG/DL — HIGH (ref 70–99)
GLUCOSE SERPL-MCNC: 104 MG/DL — HIGH (ref 70–99)
GLUCOSE SERPL-MCNC: 89 MG/DL — SIGNIFICANT CHANGE UP (ref 70–99)
HCT VFR BLD CALC: 40.8 % — SIGNIFICANT CHANGE UP (ref 39–50)
HCT VFR BLD CALC: 48.9 % — SIGNIFICANT CHANGE UP (ref 39–50)
HGB BLD-MCNC: 14 G/DL — SIGNIFICANT CHANGE UP (ref 13–17)
HGB BLD-MCNC: 15.8 G/DL — SIGNIFICANT CHANGE UP (ref 13–17)
IMM GRANULOCYTES NFR BLD AUTO: 0.7 % — SIGNIFICANT CHANGE UP (ref 0–1.5)
INR BLD: 0.99 — SIGNIFICANT CHANGE UP (ref 0.88–1.16)
INR BLD: 1.11 — SIGNIFICANT CHANGE UP (ref 0.88–1.16)
LYMPHOCYTES # BLD AUTO: 3.41 K/UL — HIGH (ref 1–3.3)
LYMPHOCYTES # BLD AUTO: 39.8 % — SIGNIFICANT CHANGE UP (ref 13–44)
MAGNESIUM SERPL-MCNC: 2.2 MG/DL — SIGNIFICANT CHANGE UP (ref 1.6–2.6)
MCHC RBC-ENTMCNC: 29.5 PG — SIGNIFICANT CHANGE UP (ref 27–34)
MCHC RBC-ENTMCNC: 30.8 PG — SIGNIFICANT CHANGE UP (ref 27–34)
MCHC RBC-ENTMCNC: 32.3 GM/DL — SIGNIFICANT CHANGE UP (ref 32–36)
MCHC RBC-ENTMCNC: 34.3 GM/DL — SIGNIFICANT CHANGE UP (ref 32–36)
MCV RBC AUTO: 89.9 FL — SIGNIFICANT CHANGE UP (ref 80–100)
MCV RBC AUTO: 91.4 FL — SIGNIFICANT CHANGE UP (ref 80–100)
MONOCYTES # BLD AUTO: 0.65 K/UL — SIGNIFICANT CHANGE UP (ref 0–0.9)
MONOCYTES NFR BLD AUTO: 7.6 % — SIGNIFICANT CHANGE UP (ref 2–14)
NEUTROPHILS # BLD AUTO: 4.09 K/UL — SIGNIFICANT CHANGE UP (ref 1.8–7.4)
NEUTROPHILS NFR BLD AUTO: 47.7 % — SIGNIFICANT CHANGE UP (ref 43–77)
NRBC # BLD: 0 /100 WBCS — SIGNIFICANT CHANGE UP (ref 0–0)
NRBC # BLD: 0 /100 WBCS — SIGNIFICANT CHANGE UP (ref 0–0)
NT-PROBNP SERPL-SCNC: 35 PG/ML — SIGNIFICANT CHANGE UP (ref 0–300)
PHOSPHATE SERPL-MCNC: 3.9 MG/DL — SIGNIFICANT CHANGE UP (ref 2.5–4.5)
PLATELET # BLD AUTO: 117 K/UL — LOW (ref 150–400)
PLATELET # BLD AUTO: 141 K/UL — LOW (ref 150–400)
POTASSIUM SERPL-MCNC: 4.2 MMOL/L — SIGNIFICANT CHANGE UP (ref 3.5–5.3)
POTASSIUM SERPL-MCNC: 4.3 MMOL/L — SIGNIFICANT CHANGE UP (ref 3.5–5.3)
POTASSIUM SERPL-SCNC: 4.2 MMOL/L — SIGNIFICANT CHANGE UP (ref 3.5–5.3)
POTASSIUM SERPL-SCNC: 4.3 MMOL/L — SIGNIFICANT CHANGE UP (ref 3.5–5.3)
PROT SERPL-MCNC: 6.2 G/DL — SIGNIFICANT CHANGE UP (ref 6–8.3)
PROT SERPL-MCNC: 7.7 G/DL — SIGNIFICANT CHANGE UP (ref 6–8.3)
PROTHROM AB SERPL-ACNC: 11.9 SEC — SIGNIFICANT CHANGE UP (ref 10.6–13.6)
PROTHROM AB SERPL-ACNC: 13.3 SEC — SIGNIFICANT CHANGE UP (ref 10.6–13.6)
RBC # BLD: 4.54 M/UL — SIGNIFICANT CHANGE UP (ref 4.2–5.8)
RBC # BLD: 5.35 M/UL — SIGNIFICANT CHANGE UP (ref 4.2–5.8)
RBC # FLD: 14.6 % — HIGH (ref 10.3–14.5)
RBC # FLD: 14.8 % — HIGH (ref 10.3–14.5)
RH IG SCN BLD-IMP: POSITIVE — SIGNIFICANT CHANGE UP
SODIUM SERPL-SCNC: 137 MMOL/L — SIGNIFICANT CHANGE UP (ref 135–145)
SODIUM SERPL-SCNC: 140 MMOL/L — SIGNIFICANT CHANGE UP (ref 135–145)
WBC # BLD: 7.71 K/UL — SIGNIFICANT CHANGE UP (ref 3.8–10.5)
WBC # BLD: 8.57 K/UL — SIGNIFICANT CHANGE UP (ref 3.8–10.5)
WBC # FLD AUTO: 7.71 K/UL — SIGNIFICANT CHANGE UP (ref 3.8–10.5)
WBC # FLD AUTO: 8.57 K/UL — SIGNIFICANT CHANGE UP (ref 3.8–10.5)

## 2021-12-09 PROCEDURE — 84100 ASSAY OF PHOSPHORUS: CPT

## 2021-12-09 PROCEDURE — 86850 RBC ANTIBODY SCREEN: CPT

## 2021-12-09 PROCEDURE — C1894: CPT

## 2021-12-09 PROCEDURE — 85025 COMPLETE CBC W/AUTO DIFF WBC: CPT

## 2021-12-09 PROCEDURE — 86923 COMPATIBILITY TEST ELECTRIC: CPT

## 2021-12-09 PROCEDURE — 93306 TTE W/DOPPLER COMPLETE: CPT | Mod: 26

## 2021-12-09 PROCEDURE — 83880 ASSAY OF NATRIURETIC PEPTIDE: CPT

## 2021-12-09 PROCEDURE — 80053 COMPREHEN METABOLIC PANEL: CPT

## 2021-12-09 PROCEDURE — C1769: CPT

## 2021-12-09 PROCEDURE — 82550 ASSAY OF CK (CPK): CPT

## 2021-12-09 PROCEDURE — 84132 ASSAY OF SERUM POTASSIUM: CPT

## 2021-12-09 PROCEDURE — 85730 THROMBOPLASTIN TIME PARTIAL: CPT

## 2021-12-09 PROCEDURE — 86900 BLOOD TYPING SEROLOGIC ABO: CPT

## 2021-12-09 PROCEDURE — 82803 BLOOD GASES ANY COMBINATION: CPT

## 2021-12-09 PROCEDURE — C1887: CPT

## 2021-12-09 PROCEDURE — 36415 COLL VENOUS BLD VENIPUNCTURE: CPT

## 2021-12-09 PROCEDURE — 71045 X-RAY EXAM CHEST 1 VIEW: CPT

## 2021-12-09 PROCEDURE — 93580 TRANSCATH CLOSURE OF ASD: CPT

## 2021-12-09 PROCEDURE — 82330 ASSAY OF CALCIUM: CPT

## 2021-12-09 PROCEDURE — 85027 COMPLETE CBC AUTOMATED: CPT

## 2021-12-09 PROCEDURE — 83735 ASSAY OF MAGNESIUM: CPT

## 2021-12-09 PROCEDURE — 85610 PROTHROMBIN TIME: CPT

## 2021-12-09 PROCEDURE — 84295 ASSAY OF SERUM SODIUM: CPT

## 2021-12-09 PROCEDURE — 86901 BLOOD TYPING SEROLOGIC RH(D): CPT

## 2021-12-09 PROCEDURE — 93306 TTE W/DOPPLER COMPLETE: CPT

## 2021-12-09 PROCEDURE — C1759: CPT

## 2021-12-09 PROCEDURE — 71045 X-RAY EXAM CHEST 1 VIEW: CPT | Mod: 26

## 2021-12-09 PROCEDURE — C1817: CPT

## 2021-12-09 PROCEDURE — 82962 GLUCOSE BLOOD TEST: CPT

## 2021-12-09 RX ORDER — DIVALPROEX SODIUM 500 MG/1
1 TABLET, DELAYED RELEASE ORAL
Qty: 0 | Refills: 0 | DISCHARGE

## 2021-12-09 RX ORDER — ATORVASTATIN CALCIUM 80 MG/1
1 TABLET, FILM COATED ORAL
Qty: 30 | Refills: 0
Start: 2021-12-09 | End: 2022-01-07

## 2021-12-09 RX ORDER — ASPIRIN/CALCIUM CARB/MAGNESIUM 324 MG
1 TABLET ORAL
Qty: 30 | Refills: 0
Start: 2021-12-09 | End: 2022-01-07

## 2021-12-09 RX ORDER — MEPERIDINE HYDROCHLORIDE 50 MG/ML
25 INJECTION INTRAMUSCULAR; INTRAVENOUS; SUBCUTANEOUS ONCE
Refills: 0 | Status: DISCONTINUED | OUTPATIENT
Start: 2021-12-09 | End: 2021-12-09

## 2021-12-09 RX ORDER — CEFAZOLIN SODIUM 1 G
2000 VIAL (EA) INJECTION EVERY 8 HOURS
Refills: 0 | Status: DISCONTINUED | OUTPATIENT
Start: 2021-12-09 | End: 2021-12-09

## 2021-12-09 RX ORDER — DEXTROSE 50 % IN WATER 50 %
25 SYRINGE (ML) INTRAVENOUS ONCE
Refills: 0 | Status: DISCONTINUED | OUTPATIENT
Start: 2021-12-09 | End: 2021-12-09

## 2021-12-09 RX ORDER — CHLORHEXIDINE GLUCONATE 213 G/1000ML
5 SOLUTION TOPICAL
Refills: 0 | Status: DISCONTINUED | OUTPATIENT
Start: 2021-12-09 | End: 2021-12-09

## 2021-12-09 RX ORDER — RAMIPRIL 5 MG
1 CAPSULE ORAL
Qty: 0 | Refills: 0 | DISCHARGE

## 2021-12-09 RX ORDER — ASPIRIN/CALCIUM CARB/MAGNESIUM 324 MG
1 TABLET ORAL
Qty: 0 | Refills: 0 | DISCHARGE

## 2021-12-09 RX ORDER — ASPIRIN/CALCIUM CARB/MAGNESIUM 324 MG
81 TABLET ORAL DAILY
Refills: 0 | Status: DISCONTINUED | OUTPATIENT
Start: 2021-12-09 | End: 2021-12-09

## 2021-12-09 RX ORDER — INSULIN LISPRO 100/ML
VIAL (ML) SUBCUTANEOUS
Refills: 0 | Status: DISCONTINUED | OUTPATIENT
Start: 2021-12-09 | End: 2021-12-09

## 2021-12-09 RX ORDER — DEXTROSE 50 % IN WATER 50 %
15 SYRINGE (ML) INTRAVENOUS ONCE
Refills: 0 | Status: DISCONTINUED | OUTPATIENT
Start: 2021-12-09 | End: 2021-12-09

## 2021-12-09 RX ORDER — SODIUM CHLORIDE 9 MG/ML
1000 INJECTION, SOLUTION INTRAVENOUS
Refills: 0 | Status: DISCONTINUED | OUTPATIENT
Start: 2021-12-09 | End: 2021-12-09

## 2021-12-09 RX ORDER — CLOPIDOGREL BISULFATE 75 MG/1
1 TABLET, FILM COATED ORAL
Qty: 30 | Refills: 0
Start: 2021-12-09 | End: 2022-01-07

## 2021-12-09 RX ORDER — DEXTROSE 50 % IN WATER 50 %
12.5 SYRINGE (ML) INTRAVENOUS ONCE
Refills: 0 | Status: DISCONTINUED | OUTPATIENT
Start: 2021-12-09 | End: 2021-12-09

## 2021-12-09 RX ORDER — HEPARIN SODIUM 5000 [USP'U]/ML
5000 INJECTION INTRAVENOUS; SUBCUTANEOUS EVERY 8 HOURS
Refills: 0 | Status: DISCONTINUED | OUTPATIENT
Start: 2021-12-09 | End: 2021-12-09

## 2021-12-09 RX ORDER — SODIUM CHLORIDE 9 MG/ML
1000 INJECTION INTRAMUSCULAR; INTRAVENOUS; SUBCUTANEOUS
Refills: 0 | Status: DISCONTINUED | OUTPATIENT
Start: 2021-12-09 | End: 2021-12-09

## 2021-12-09 RX ORDER — GLUCAGON INJECTION, SOLUTION 0.5 MG/.1ML
1 INJECTION, SOLUTION SUBCUTANEOUS ONCE
Refills: 0 | Status: DISCONTINUED | OUTPATIENT
Start: 2021-12-09 | End: 2021-12-09

## 2021-12-09 RX ORDER — DIVALPROEX SODIUM 500 MG/1
1 TABLET, DELAYED RELEASE ORAL
Qty: 60 | Refills: 0
Start: 2021-12-09 | End: 2022-01-07

## 2021-12-09 RX ORDER — PANTOPRAZOLE SODIUM 20 MG/1
40 TABLET, DELAYED RELEASE ORAL DAILY
Refills: 0 | Status: DISCONTINUED | OUTPATIENT
Start: 2021-12-09 | End: 2021-12-09

## 2021-12-09 RX ORDER — ATORVASTATIN CALCIUM 80 MG/1
1 TABLET, FILM COATED ORAL
Qty: 0 | Refills: 0 | DISCHARGE

## 2021-12-09 RX ORDER — CLOPIDOGREL BISULFATE 75 MG/1
1 TABLET, FILM COATED ORAL
Qty: 0 | Refills: 0 | DISCHARGE

## 2021-12-09 RX ADMIN — Medication 100 MILLIGRAM(S): at 16:00

## 2021-12-09 RX ADMIN — PANTOPRAZOLE SODIUM 40 MILLIGRAM(S): 20 TABLET, DELAYED RELEASE ORAL at 12:57

## 2021-12-09 RX ADMIN — Medication 81 MILLIGRAM(S): at 12:57

## 2021-12-09 RX ADMIN — SODIUM CHLORIDE 10 MILLILITER(S): 9 INJECTION INTRAMUSCULAR; INTRAVENOUS; SUBCUTANEOUS at 10:19

## 2021-12-09 RX ADMIN — HEPARIN SODIUM 5000 UNIT(S): 5000 INJECTION INTRAVENOUS; SUBCUTANEOUS at 12:58

## 2021-12-09 NOTE — H&P ADULT - HISTORY OF PRESENT ILLNESS
57 y/o Male who presented to [facility] office after a cryptogenic R. MCA embolic stroke on 5/7/2021. Pt left with residual cognitive functioning impairment and emotional lability. He was previously an , but no longer feels he can complete those tasks. He had an ILR placed in May, and no afib since. TTE showed normal LVEF, but KAUR showed +PFO on bubble study on 10/2021. He had a Hematology workup that was negative (except for slight AT3 levels low, but no plan for long term anticoagulation). Referred to [facility] for consultation and deemed a candidate. Denies chest pain, or SOB. Presents today for PFO closure with Dr. Cardoza.   Patient seen in same day holding area; Reports no changes to PMHx or medications since last seen by our team. Denies acute or current SOB, chest pain, palpitation, N/V/D, fever/chills, recent illness, or any other concerning symptoms. He took his Plavix 75mg PO this AM, as well as Depakote 500mg.

## 2021-12-09 NOTE — H&P ADULT - NSHPPHYSICALEXAM_GEN_ALL_CORE
CONSTITUTIONAL: Well-nourished, appears stated age,  normal affect, male lying comfortably in bed, in no acute distress  NEURO: CN II-XII grossly intact, A&Ox3, no focal deficits.                 EYES: PERRLA, EOMI, no conjunctival injection  ENMT: Moist mucous membranes, no erythema, no lymphadenopathy, trachea midline.   CV: S1S2, RRR, no murmurs appreciated on exam.   RESPIRATORY: Clear to auscultation bilaterally, no wheezes rales or rhonchi.   GI: abdomen soft, non tender, non distended, +bowel sounds.   : Deferred  MUSKULOSKELETAL: 5/5 strength b/l, good range of motion in all extremities, no swollen or erythematous joints.   SKIN / BREAST: no obvious rashes  VASCULAR: DP/PT pulses 2+ b/l, Radial 2+b/l.

## 2021-12-09 NOTE — H&P ADULT - NSHPREVIEWOFSYSTEMS_GEN_ALL_CORE
Review of Systems  CONSTITUTIONAL:  +anxiety, Denies Fevers / chills, sweats, fatigue, weight loss, weight gain                                      NEURO:  Denies parathesias, seizures, syncope, confusion                                                                                EYES:  Denies Blurry vision, discharge, pain, loss of vision                                                                                    ENMT:  Denies Difficulty hearing, vertigo, dysphagia, epistaxis, recent dental work                                       CV:  Denies Chest pain, palpitations, SANTOS, orthopnea                                                                                          RESPIRATORY:  Denies Wheezing, SOB, cough / sputum, hemoptysis                                                                GI:  Denies Nausea, vommiting, diarrhea, constipation, melena, difficulty swallowing                                               : Denies Hematuria, dysuria, urgency, incontinence                                                                                         MUSKULOSKELETAL:  Denies arthritis, joint swelling, muscle weakness                                                             SKIN/BREAST:  Denies rash, itching, silvano loss, masses                                                                                            PSYCH:  Denies depresion, anxiety, suicidal ideation                                                                                               HEME/LYMPH:  Denies bruises easily, enlarged lymph nodes, tender lymph nodes                                        ENDOCRINE:  Denies cold intolerance, heat intolerance, polydipsia

## 2021-12-09 NOTE — H&P ADULT - ASSESSMENT
57 y/o Male who presented to [facility] office after a cryptogenic R. MCA embolic stroke on 5/7/2021. Pt left with residual cognitive functioning impairment and emotional lability. He was previously an , but no longer feels he can complete those tasks. He had an ILR placed in May, and no afib since. TTE showed normal LVEF, but KAUR showed +PFO on bubble study on 10/2021. He had a Hematology workup that was negative (except for slight AT3 levels low, but no plan for long term anticoagulation). Referred to [facility] for consultation and deemed a candidate. Denies chest pain, or SOB. Presents today for PFO closure with Dr. Cardoza.   Patient seen in same day holding area; Reports no changes to PMHx or medications since last seen by our team. Denies acute or current SOB, chest pain, palpitation, N/V/D, fever/chills, recent illness, or any other concerning symptoms. He took his Plavix 75mg PO this AM, as well as Depakote 500mg.     Admit under Dr. Cardoza via same day surgery. Consent signed, placed on chart.  Risks/benefits reviewed, patient understands and agrees. T&S ordered and blood products placed on hold for OR.  To 9[facility]  post-op.   55 y/o Male who presented to [facility] office after a cryptogenic R. MCA embolic stroke on 5/7/2021. Pt left with residual cognitive functioning impairment and emotional lability. He was previously an , but no longer feels he can complete those tasks. He had an ILR placed in May, and no afib since. TTE showed normal LVEF, but KAUR showed +PFO on bubble study on 10/2021. He had a Hematology workup that was negative (except for slight AT3 levels low, but no plan for long term anticoagulation). Referred to [facility] for consultation and deemed a candidate. Denies chest pain, or SOB. Presents today for PFO closure with Dr. Cardoza.   Patient seen in same day holding area; Reports no changes to PMHx or medications since last seen by our team. Denies acute or current SOB, chest pain, palpitation, N/V/D, fever/chills, recent illness, or any other concerning symptoms. He took his Plavix 75mg PO this AM, as well as Depakote 500mg.     Admit under Dr. Cardoza via same day surgery. Consent signed, placed on chart.  Risks/benefits reviewed, patient understands and agrees. T&S ordered and blood products placed on hold for OR.  To 9[facility] Mini ICU  post-op.

## 2021-12-09 NOTE — DISCHARGE NOTE PROVIDER - CARE PROVIDER_API CALL
Donavan Cardoza (MD)  Cardiovascular Disease; Internal Medicine; Interventional Cardiology  [address], [address]  [place], [place] [address]  Phone: [phone]  Fax: [phone]  Follow Up Time:

## 2021-12-09 NOTE — DISCHARGE NOTE PROVIDER - HOSPITAL COURSE
57 y/o Male who presented to [facility] office after a cryptogenic R. MCA embolic stroke on 5/7/2021. Pt left with residual cognitive functioning impairment and emotional lability. He was previously an , but no longer feels he can complete those tasks. He had an ILR placed in May, and no afib since. TTE showed normal LVEF, but KAUR showed +PFO on bubble study on 10/2021. He had a Hematology workup that was negative (except for slight AT3 levels low, but no plan for long term anticoagulation). Referred to [facility] for consultation and deemed a candidate.  Presented today for PFO closure with Dr. Cardoza.     He is now post op from PFO closure, did well.  Cleared for discharge same day if echo stable.

## 2021-12-09 NOTE — H&P ADULT - NSHPLABSRESULTS_GEN_ALL_CORE
CT/MRI: 5/7/21 CT of the head was done which noted acute infarction in the right MCA distribution, left external capsule lacunar infarct, age-indeterminate. CTA of the head and neck noted severe stenosis or occlusion of the inferior division of the M3 segment of the right middle cerebral artery.     MRI of brain (5/7/21) noted acute, large, right MCA distribution infarct, predominantly cortical base with developing mass-effect with 6 mm right to left midline shift and petechial blood product.

## 2021-12-09 NOTE — DISCHARGE NOTE PROVIDER - NSDCFUADDAPPT_GEN_ALL_CORE_FT
-Please follow up with Dr. Cardoza on  The office is located at [facility], [facility], 4th floor. Call us with any questions #[phone].    -Walk daily as tolerated and use your incentive spirometer every hour.    -Call your doctor if you have shortness of breath, chest pain not relieved by pain medication, dizziness, fever >101.5, or increased redness or drainage from incisions.   -Please follow up with Dr. Cardoza in 1-2 weeks.  Please call the office to schedule a follow up visit.  The office is located at [facility], 4th floor. Call us with any questions #[phone].    -Walk daily as tolerated and use your incentive spirometer every hour.    -Call your doctor if you have shortness of breath, chest pain not relieved by pain medication, dizziness, fever >101.5, or increased redness or drainage from incisions.

## 2021-12-09 NOTE — BRIEF OPERATIVE NOTE - COMMENTS
Dr. Duarte was the first assistant for this case including but not limited to PFOs closure and intracardiac echocardiogram    I was present for this procedure and participated as first assistant as described by the PA above, unless otherwise noted below.

## 2021-12-09 NOTE — DISCHARGE NOTE PROVIDER - NSDCCPCAREPLAN_GEN_ALL_CORE_FT
PRINCIPAL DISCHARGE DIAGNOSIS  Diagnosis: PFO (patent foramen ovale)  Assessment and Plan of Treatment:

## 2021-12-09 NOTE — H&P ADULT - NSICDXPASTMEDICALHX_GEN_ALL_CORE_FT
PAST MEDICAL HISTORY:  CVA (cerebrovascular accident)     PFO (patent foramen ovale) s/p closure 12/9/21

## 2021-12-09 NOTE — DISCHARGE NOTE NURSING/CASE MANAGEMENT/SOCIAL WORK - NSDCPEFALRISK_GEN_ALL_CORE
For information on Fall & Injury Prevention, visit: [email or web address] OR  [email or web address] OR  https://www.cdc.gov/steadi/patient.html

## 2021-12-09 NOTE — PROGRESS NOTE ADULT - ASSESSMENT
57 y/o Male who presented to [facility] office after a cryptogenic R. MCA embolic stroke on 5/7/2021. Pt left with residual cognitive functioning impairment and emotional lability. He was previously an , but no longer feels he can complete those tasks. He had an ILR placed in May, and no afib since. TTE showed normal LVEF, but KAUR showed +PFO on bubble study on 10/2021. He had a Hematology workup that was negative (except for slight AT3 levels low, but no plan for long term anticoagulation). Referred to [facility] for consultation and deemed a candidate.  Presented today for PFO closure with Dr. Cardoza.     Patient seen in same day holding area; Reported no changes to PMHx or medications since last seen by our team. Denies acute or current SOB, chest pain, palpitation, N/V/D, fever/chills, recent illness, or any other concerning symptoms. He took his Plavix 75mg PO this AM, as well as Depakote 500mg.     He is now post op from PFO closure, doing well.  Recovering on 9LA.  If echo done and stable, can go home later early evening.      Neuro:  No pain  No neuro deficits  Home Depakote  Presented w/ stroke, now post PFO closure as likely cause.     CV:  PFO closure today  Stable and ready for potential D/C home later today  TTE around 4pm.   Post op labs stable.      Pulm:   No issues  Xray stable    GI:   PO diet when awake and able to tolerate.     :  No issues, creatinine/BUN stable    ID:  No issues  No fevers    Home later or tomorrow am

## 2021-12-09 NOTE — DISCHARGE NOTE PROVIDER - NSDCMRMEDTOKEN_GEN_ALL_CORE_FT
aspirin 81 mg oral delayed release tablet: 1 tab(s) orally once a day  atorvastatin 40 mg oral tablet: 1 tab(s) orally once a day  Depakote 500 mg oral delayed release tablet: 1 tab(s) orally 2 times a day  Plavix 75 mg oral tablet: 1 tab(s) orally once a day  ramipril 2.5 mg oral tablet: 1 tab(s) orally once a day

## 2021-12-09 NOTE — DISCHARGE NOTE NURSING/CASE MANAGEMENT/SOCIAL WORK - PATIENT PORTAL LINK FT
You can access the FollowMyHealth Patient Portal offered by [facility] by registering at the following website: [email or web address]. By joining [facility]’s FollowMyHealth portal, you will also be able to view your health information using other applications (apps) compatible with our system.

## 2021-12-09 NOTE — BRIEF OPERATIVE NOTE - ANTIBIOTIC PROTOCOL
Followed protocol
Normal vision: sees adequately in most situations; can see medication labels, newsprint

## 2021-12-09 NOTE — DISCHARGE NOTE NURSING/CASE MANAGEMENT/SOCIAL WORK - NSDCFUADDAPPT_GEN_ALL_CORE_FT
-Please follow up with Dr. Cardoza in 1-2 weeks.  Please call the office to schedule a follow up visit.  The office is located at [facility], 4th floor. Call us with any questions #[phone].    -Walk daily as tolerated and use your incentive spirometer every hour.    -Call your doctor if you have shortness of breath, chest pain not relieved by pain medication, dizziness, fever >101.5, or increased redness or drainage from incisions.

## 2021-12-13 PROBLEM — I63.9 CEREBRAL INFARCTION, UNSPECIFIED: Chronic | Status: ACTIVE | Noted: 2021-12-09

## 2021-12-13 PROBLEM — Q21.1 ATRIAL SEPTAL DEFECT: Chronic | Status: ACTIVE | Noted: 2021-12-09

## 2021-12-15 DIAGNOSIS — Z79.02 LONG TERM (CURRENT) USE OF ANTITHROMBOTICS/ANTIPLATELETS: ICD-10-CM

## 2021-12-15 DIAGNOSIS — I69.318 OTHER SYMPTOMS AND SIGNS INVOLVING COGNITIVE FUNCTIONS FOLLOWING CEREBRAL INFARCTION: ICD-10-CM

## 2021-12-15 DIAGNOSIS — Q21.1 ATRIAL SEPTAL DEFECT: ICD-10-CM

## 2021-12-15 DIAGNOSIS — Z79.82 LONG TERM (CURRENT) USE OF ASPIRIN: ICD-10-CM

## 2021-12-15 DIAGNOSIS — I69.398 OTHER SEQUELAE OF CEREBRAL INFARCTION: ICD-10-CM

## 2021-12-15 DIAGNOSIS — R45.86 EMOTIONAL LABILITY: ICD-10-CM

## 2021-12-17 NOTE — REASON FOR VISIT
[Congenital Heart Disease] : congenital heart disease [Structural Heart and Valve Disease] : structural heart and valve disease

## 2021-12-20 ENCOUNTER — NON-APPOINTMENT (OUTPATIENT)
Age: 56
End: 2021-12-20

## 2021-12-20 ENCOUNTER — APPOINTMENT (OUTPATIENT)
Dept: HEART AND VASCULAR | Facility: CLINIC | Age: 56
End: 2021-12-20
Payer: COMMERCIAL

## 2021-12-20 VITALS
OXYGEN SATURATION: 98 % | DIASTOLIC BLOOD PRESSURE: 80 MMHG | WEIGHT: 185 LBS | SYSTOLIC BLOOD PRESSURE: 110 MMHG | HEART RATE: 59 BPM | BODY MASS INDEX: 27.4 KG/M2 | TEMPERATURE: 98.7 F | HEIGHT: 69 IN

## 2021-12-20 PROCEDURE — 99213 OFFICE O/P EST LOW 20 MIN: CPT

## 2021-12-20 PROCEDURE — 93000 ELECTROCARDIOGRAM COMPLETE: CPT

## 2021-12-20 RX ORDER — DIVALPROEX SODIUM 250 MG/1
250 TABLET, DELAYED RELEASE ORAL
Refills: 0 | Status: DISCONTINUED | COMMUNITY
End: 2021-12-20

## 2021-12-20 NOTE — HISTORY OF PRESENT ILLNESS
[FreeTextEntry1] : 56 year old male with a past medical history of cryptogenic right MCA embolic stroke on 5/7/2021 with a PFO who underwent a PFO closure on 12/9/2021 who presents for follow up after discharge.  \par \par The procedure was uncomplicated and the patient went home on post op day 0.\par \par Since the procedure, the patient states feeling well, no issues except for 2 episodes of chest discomfort.\par He also reports some leg cramping.\par Otherwise, no SOB, NYHA 1 symptoms

## 2021-12-20 NOTE — ASSESSMENT
[FreeTextEntry1] : CP- EKG reviewed, NSR, normal EKG\par -likely atelectasis post-procedure\par -improved now\par \par Leg cramps- check CMP, lytes\par \par s/p PFO closure\par -ASA/Plavix\par -TTE with bubbles at 1 month\par -doing well\par \par

## 2021-12-20 NOTE — REVIEW OF SYSTEMS
[Speech Disturbance] : speech disturbance [Memory Lapses Or Loss] : memory lapses or loss [Fever] : no fever [Chills] : no chills [Blurry Vision] : no blurred vision [Earache] : no earache [SOB] : no shortness of breath [Leg Claudication] : no intermittent leg claudication [Cough] : no cough [Abdominal Pain] : no abdominal pain [Urinary Frequency] : no change in urinary frequency [Joint Pain] : no joint pain [Rash] : no rash [Dizziness] : no dizziness [Convulsions] : no convulsions [Confusion] : no confusion was observed [Easy Bleeding] : no tendency for easy bleeding

## 2021-12-20 NOTE — CARDIOLOGY SUMMARY
[de-identified] : KAUR 10/2021- reviewed\par +PFO on bubbles\par \par 12/2021- s/p PFO closure, no effusion, well seated device [de-identified] : 5/7/21 CT of the head was done which noted acute infarction in the right MCA distribution, left external capsule lacunar infarct, age-indeterminate. CTA of the head and neck noted severe stenosis or occlusion of the inferior division of the M3 segment of the right middle cerebral artery. \par \par MRI of brain (5/7/21) noted acute, large, right MCA distribution infarct, predominantly cortical base with developing mass-effect with 6 mm right to left midline shift and petechial blood product.

## 2021-12-24 ENCOUNTER — RESULT REVIEW (OUTPATIENT)
Age: 56
End: 2021-12-24

## 2022-01-07 ENCOUNTER — APPOINTMENT (OUTPATIENT)
Dept: NEUROLOGY | Facility: CLINIC | Age: 57
End: 2022-01-07
Payer: COMMERCIAL

## 2022-01-07 DIAGNOSIS — Z86.39 PERSONAL HISTORY OF OTHER ENDOCRINE, NUTRITIONAL AND METABOLIC DISEASE: ICD-10-CM

## 2022-01-07 DIAGNOSIS — R56.9 UNSPECIFIED CONVULSIONS: ICD-10-CM

## 2022-01-07 DIAGNOSIS — Z81.8 FAMILY HISTORY OF OTHER MENTAL AND BEHAVIORAL DISORDERS: ICD-10-CM

## 2022-01-07 DIAGNOSIS — Z78.9 OTHER SPECIFIED HEALTH STATUS: ICD-10-CM

## 2022-01-07 DIAGNOSIS — Z87.39 PERSONAL HISTORY OF OTHER DISEASES OF THE MUSCULOSKELETAL SYSTEM AND CONNECTIVE TISSUE: ICD-10-CM

## 2022-01-07 DIAGNOSIS — Z86.73 PERSONAL HISTORY OF TRANSIENT ISCHEMIC ATTACK (TIA), AND CEREBRAL INFARCTION W/OUT RESIDUAL DEFICITS: ICD-10-CM

## 2022-01-07 DIAGNOSIS — Z83.79 FAMILY HISTORY OF OTHER DISEASES OF THE DIGESTIVE SYSTEM: ICD-10-CM

## 2022-01-07 PROCEDURE — 99214 OFFICE O/P EST MOD 30 MIN: CPT | Mod: 95

## 2022-01-07 NOTE — PHYSICAL EXAM
[FreeTextEntry1] : Mental status:\par Orientation: Alert , oriented to month, day of week, year, location                                                                           Speech is fluent , able to name objects, repeat a sentence and write a sentence                                                                                                                                                                                       \par Cranial nerves:\par CN I deferred. CN  II deferred on telelhealth visit but patient reports no change in vision ; fundus exam benign; III, IV, VI: PERRLA, EOM IV: Facial sensation normal B/L to touch, pinprick and temperature VII:Facial strength normal B/L. VIII: Gross hearing intact IX, X: palate midline and elevates symmetrically XI: Trapezius normal strength, XII: Tongue midline without atrophy or fasciculation\par Motor: Muscle tone no rigidity or resistance in all 4 extremities. No atrophy or fasciculation. Muscle strength: arms and legs, proximal and distal flexors and extensors are normal 5/5 . No UE drift.\par Sensory:tactile sensation on left hand illicit a cold sensation \par Coordination: finger to nose: normal. Heel to shin: normal\par Gait: steady normal based ; Romberg test negative \par \par

## 2022-01-07 NOTE — HISTORY OF PRESENT ILLNESS
[FreeTextEntry1] : 56 year old male seen for second opinion for stroke prevention and behavior changes after stroke\par Patient sustained cryptogenic right mca embolic stroke on 5/7/21. Although mild RHP initially noticed has resolved he has residual impairment in higher cognitive functioning affecting his speed of processing, multitask, emotional lability, concentration. 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 He has been overwhelmed with reading email, using the computer and even typing. His frustration with these tasks has led to some emotional outbursts and even breaking furniture. He states he is learning how to prevent being "flooded" by removing himself from an aggravating situation. He admits that he loses his temper more readily than before . This has affected his interaction with his wife and children but feels the situation is improving.\par \par  He has an ILR placed but thus far no Afib/flutter detected. He was noted to have mild low antithrombin 3 activity for which he is just on aspirin as per hematology and uses eliquis if taking a long flight. TTE did not show any valve abnormality or any indirect suggestion of an PFO . \par \par After leaving my office on Nov 2 he noted involuntary stiffening of his right hand consistent with a partial seizure. He went to the ER and the symptoms had resolved. He was started on Depakote for seizure prevention which he has continued. Clinically he was back to his baseline and no new vascular event suspected. He had KAUR during his hospital stay that revealed a PFO. He was referred to Dr. Cardoza and he underwent PFO closure in December 2021\par \par Depakote dose was reduced after lab level was elevated. He is currently on Depakote to 500 mg bid and has noted in reduction in lethargy. He continues to have episodes of a cold sensation come over his left hand . He felt this right before he had clonic activity in right hand that led to last hospital admission for focal seizure. He denies any involuntary movement now and just intermittent parasthesia over left hand \par \par Over Melanie he contracted COVID resulting in loss of taste, URI , fever.   He has physically improved but he still troubled by loss of taste, fatigue, muscle aches

## 2022-01-07 NOTE — REASON FOR VISIT
[Home] : at home, [unfilled] , at the time of the visit. [Medical Office: ([facility])___] : at the medical office located in  [Verbal consent obtained from patient] : the patient, [unfilled] [Follow-Up: _____] : a [unfilled] follow-up visit [FreeTextEntry1] : stroke prevention  [Consultation] : a consultation visit [Spouse] : spouse

## 2022-01-07 NOTE — REVIEW OF SYSTEMS
[Feeling Poorly] : feeling poorly [Feeling Tired] : feeling tired [Anxiety] : anxiety [Depression] : depression [Emotional Problems] : emotional problems [As Noted in HPI] : as noted in HPI [Confused or Disoriented] : confusion [Memory Lapses or Loss] : memory loss [Decr. Concentrating Ability] : decreased concentrating ability [Changed Thought Patterns] : changed thought patterns [Repeating Questions] : repeated questioning about recent events [Difficulty Writing] : difficulty writing [Abnormal Sensation] : an abnormal sensation [Hypersensitivity] : hypersensitivity [Arthralgias] : arthralgias [Negative] : Heme/Lymph

## 2022-01-07 NOTE — DATA REVIEWED
[de-identified] : MRI brain showed acute right mca ischemic infarct\par CTA head showed abrupt cutoff of right M3 but no proximal artery stenosis

## 2022-01-20 ENCOUNTER — RESULT REVIEW (OUTPATIENT)
Age: 57
End: 2022-01-20

## 2022-01-24 LAB
CHOLEST SERPL-MCNC: 117 MG/DL
HDLC SERPL-MCNC: 35 MG/DL
LDLC SERPL CALC-MCNC: 54 MG/DL
NONHDLC SERPL-MCNC: 82 MG/DL
TRIGL SERPL-MCNC: 143 MG/DL
VALPROATE SERPL-MCNC: 90 UG/ML

## 2022-01-28 ENCOUNTER — APPOINTMENT (OUTPATIENT)
Dept: NEUROLOGY | Facility: CLINIC | Age: 57
End: 2022-01-28
Payer: COMMERCIAL

## 2022-01-28 ENCOUNTER — APPOINTMENT (OUTPATIENT)
Dept: HEART AND VASCULAR | Facility: CLINIC | Age: 57
End: 2022-01-28
Payer: COMMERCIAL

## 2022-01-28 ENCOUNTER — APPOINTMENT (OUTPATIENT)
Dept: HEART AND VASCULAR | Facility: CLINIC | Age: 57
End: 2022-01-28

## 2022-01-28 VITALS
OXYGEN SATURATION: 98 % | TEMPERATURE: 98.7 F | BODY MASS INDEX: 27.4 KG/M2 | HEART RATE: 60 BPM | DIASTOLIC BLOOD PRESSURE: 70 MMHG | SYSTOLIC BLOOD PRESSURE: 115 MMHG | WEIGHT: 185 LBS | HEIGHT: 69 IN

## 2022-01-28 PROCEDURE — 95708 EEG WO VID EA 12-26HR UNMNTR: CPT

## 2022-01-28 PROCEDURE — 99214 OFFICE O/P EST MOD 30 MIN: CPT

## 2022-01-28 PROCEDURE — 95816 EEG AWAKE AND DROWSY: CPT

## 2022-01-28 RX ORDER — CLOPIDOGREL BISULFATE 75 MG/1
75 TABLET, FILM COATED ORAL
Qty: 30 | Refills: 0 | Status: DISCONTINUED | COMMUNITY
Start: 2021-11-29 | End: 2022-01-28

## 2022-01-28 RX ORDER — CLOPIDOGREL 75 MG/1
75 TABLET, FILM COATED ORAL DAILY
Refills: 0 | Status: DISCONTINUED | COMMUNITY
End: 2022-01-28

## 2022-01-28 NOTE — REASON FOR VISIT
[FreeTextEntry1] : 55 y/o M who presented with cryptogenic R. MCA embolic stroke on 5/7/2021. Pt left with residual cognitive functioning impairment and emotional lability. He was previously an , but no longer feels he can complete those tasks. He had an ILR placed in May, and no afib since. TTE showed normal LVEF, but KAUR showed +PFO on bubble study. \par He had a Hematology workup that was negative (except for slight AT3 levels low, but no plan for long term anticoagulation). After MDT discussion including neurology he was deemed a good candidate for PFO occlusion.\par Pt had a successful PFO occlusion with a 30mm Amplatz PFO occluder on 12/9/2021.\par Since then pt has been feeling fine. He had COVID with mild symptoms. \par no SOB/CP\par No seizures, but does not a L. hand tremor occasionally for which he is getting an EEG today\par \par TTE 1/21/2022 reviewed: no bubbles crossing on bubble study, normal LVEF\par EEG today pending\par Lipid panel: 1/24/2022 LDL 54, HDL 35

## 2022-01-28 NOTE — ASSESSMENT
[FreeTextEntry1] : 55 y/o M with crytpogenic stroke, HTN, PFO, seizures on depakote s/p successful PFO occlusion with a 30mm Amplatz PFO occluder.\par Doing well, NYHA 1 symptoms\par TTE reviewed from 1/21/2021 showing no bubbles crossing.\par Ok to d/c plavix and continue ASA monotherapy\par Abx prophylactic prior to dental work discussed\par F/u with Dr. Weaver\par Continue ramipril\par HLD- continue statin\par EEG plan today as per Neuro team

## 2022-01-29 PROCEDURE — 95721 EEG PHY/QHP>36<60 HR W/O VID: CPT

## 2022-01-30 PROCEDURE — 95708 EEG WO VID EA 12-26HR UNMNTR: CPT

## 2022-01-30 PROCEDURE — 95700 EEG CONT REC W/VID EEG TECH: CPT

## 2022-01-31 ENCOUNTER — APPOINTMENT (OUTPATIENT)
Dept: NEUROLOGY | Facility: CLINIC | Age: 57
End: 2022-01-31

## 2022-02-28 ENCOUNTER — APPOINTMENT (OUTPATIENT)
Dept: NEUROLOGY | Facility: CLINIC | Age: 57
End: 2022-02-28
Payer: COMMERCIAL

## 2022-02-28 VITALS
HEART RATE: 72 BPM | SYSTOLIC BLOOD PRESSURE: 116 MMHG | WEIGHT: 190 LBS | BODY MASS INDEX: 28.14 KG/M2 | HEIGHT: 69 IN | TEMPERATURE: 97.2 F | DIASTOLIC BLOOD PRESSURE: 78 MMHG

## 2022-02-28 PROCEDURE — 99214 OFFICE O/P EST MOD 30 MIN: CPT

## 2022-02-28 NOTE — HISTORY OF PRESENT ILLNESS
[FreeTextEntry1] : Josemanuel Minaya is a 57 year old man presenting for a follow up appointment for right MCA embolic stroke on 5/7/21.\par No seizures noted since last appointment by him or his family. Currently on Depakote 500mg twice daily. \par Doing OT currently for his hand. Did not due cognitive tasks with therapy. Becomes overwhelmed and becomes "flooded" Difficulty with multiple stimuli  and multi tasking. Easily distracted. Can perform IADLs. Does not feel he has full cognitive improvement back to baseline. Feels foggy in AM. Did not do cognitive therapy.\par Endorses resting tremor left >right hand. No change with handwriting or interferences with eating or drinking.\par \par Currently off Plavix. On aspirin 81mg.\par Had PFO occlusion with Dr. Cardoza. \par \par \par 1/7/22\par 56 year old male seen for second opinion for stroke prevention and behavior changes after stroke\par Patient sustained cryptogenic right mca embolic stroke on 5/7/21. Although mild RHP initially noticed has resolved he has residual impairment in higher cognitive functioning affecting his speed of processing, multitask, emotional lability, concentration. 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 He has been overwhelmed with reading email, using the computer and even typing. His frustration with these tasks has led to some emotional outbursts and even breaking furniture. He states he is learning how to prevent being "flooded" by removing himself from an aggravating situation. He admits that he loses his temper more readily than before . This has affected his interaction with his wife and children but feels the situation is improving.\par \par  He has an ILR placed but thus far no Afib/flutter detected. He was noted to have mild low antithrombin 3 activity for which he is just on aspirin as per hematology and uses eliquis if taking a long flight. TTE did not show any valve abnormality or any indirect suggestion of an PFO . \par \par After leaving my office on Nov 2 he noted involuntary stiffening of his right hand consistent with a partial seizure. He went to the ER and the symptoms had resolved. He was started on Depakote for seizure prevention which he has continued. Clinically he was back to his baseline and no new vascular event suspected. He had KAUR during his hospital stay that revealed a PFO. He was referred to Dr. Cardoza and he underwent PFO closure in December 2021\par \par Depakote dose was reduced after lab level was elevated. He is currently on Depakote to 500 mg bid and has noted in reduction in lethargy. He continues to have episodes of a cold sensation come over his left hand . He felt this right before he had clonic activity in right hand that led to last hospital admission for focal seizure. He denies any involuntary movement now and just intermittent parasthesia over left hand \par \par Over [place] he contracted COVID resulting in loss of taste, URI , fever.   He has physically improved but he still troubled by loss of taste, fatigue, muscle aches

## 2022-02-28 NOTE — ASSESSMENT
[FreeTextEntry1] : 57 year old with minimal vascular risk factors sustained cryptogenic embolic right mca stroke with residual impairment in executive cognitive functioning and emotional lability.\par He had a focal seizure for which he is on Depakote. He keeps having episodes of cold sensation over left hand while on Depakote and is working with OT. \par \par 1) stroke prevention- s/p PFO closure continue Aspirin 81mg as per Dr. Cardoza, ILR in place but thus far no Afib/flutter.\par  LDL   < 70 mg dl, continue statin. \par \par 2) Seizure prevention - continue Depakote 500 mg bid and level therapeutic.\par Long term EEG to assure no sensory partial seizure reviewed with patient and family. \par \par 3) Cognitive difficulties- continue follow up with neuropsychologist and cognitive rehab.\par \par Follow up in 4 months.

## 2022-02-28 NOTE — DATA REVIEWED
[de-identified] : MRI brain showed acute right mca ischemic infarct\par CTA head showed abrupt cutoff of right M3 but no proximal artery stenosis  [de-identified] : 1/31/22\par Routine and ambulatory EEG-abundant right central and temporal slowing.

## 2022-02-28 NOTE — PHYSICAL EXAM
[FreeTextEntry1] : Mental status:\par speech is fluent. \par CN1 deferred.\par Motor: able to move all four extremities.\par VIII: Gross hearing intact \par No UE drift. \par Gait: steady normal based \par \par 11/18/21\par Mental status:\par Orientation: Alert , oriented to month, day of week, year, location                                                                           Speech is fluent , able to name objects, repeat a sentence and write a sentence                                                                                                                                                                                       \par Cranial nerves:\par CN I deferred. CN  II deferred on telelhealth visit but patient reports no change in vision ; fundus exam benign; III, IV, VI: PERRLA, EOM IV: Facial sensation normal B/L to touch, pinprick and temperature VII:Facial strength normal B/L. VIII: Gross hearing intact IX, X: palate midline and elevates symmetrically XI: Trapezius normal strength, XII: Tongue midline without atrophy or fasciculation\par Motor: Muscle tone no rigidity or resistance in all 4 extremities. No atrophy or fasciculation. Muscle strength: arms and legs, proximal and distal flexors and extensors are normal 5/5 . No UE drift.\par Sensory:tactile sensation on left hand illicit a cold sensation \par Coordination: finger to nose: normal. Heel to shin: normal\par Gait: steady normal based ; Romberg test negative \par \par

## 2022-04-11 PROBLEM — Z11.59 SCREENING FOR VIRAL DISEASE: Status: ACTIVE | Noted: 2021-08-06

## 2022-06-20 ENCOUNTER — APPOINTMENT (OUTPATIENT)
Dept: NEUROLOGY | Facility: CLINIC | Age: 57
End: 2022-06-20
Payer: COMMERCIAL

## 2022-06-20 VITALS
HEART RATE: 104 BPM | WEIGHT: 190 LBS | BODY MASS INDEX: 28.14 KG/M2 | HEIGHT: 69 IN | SYSTOLIC BLOOD PRESSURE: 133 MMHG | DIASTOLIC BLOOD PRESSURE: 89 MMHG

## 2022-06-20 PROCEDURE — 99215 OFFICE O/P EST HI 40 MIN: CPT

## 2022-06-20 NOTE — HISTORY OF PRESENT ILLNESS
[FreeTextEntry1] : Josemanuel Minaya is a 57 year old man presenting for a follow up appointment for right MCA embolic stroke on 5/7/21.\par No seizures noted since last appointment by him or his family. Currently on Depakote 500 mg twice daily. \par Completed OT and has noted improvement in motor task ability. He is due to start speech therapy tomorrow. He is having less emotional outburst attributed to greater insight in disability from stroke , Depakote, and using techniques he has learned in therapy. He still needs frequent naps after mental exertion which refresh him.  \par \par Currently off Plavix. On aspirin 81mg.\par Had PFO occlusion with Dr. Cardoza. \par \par \par

## 2022-06-20 NOTE — DATA REVIEWED
[de-identified] : MRI brain showed acute right mca ischemic infarct\par CTA head showed abrupt cutoff of right M3 but no proximal artery stenosis  [de-identified] : 1/31/22\par Routine and ambulatory EEG-abundant right central and temporal slowing.

## 2022-06-20 NOTE — PHYSICAL EXAM
[FreeTextEntry1] : \par Mental status:\par Orientation: Alert , oriented to month, day of week, year, location                                                                           Speech is fluent , able to name objects, repeat a sentence and write a sentence                                                                                                                                                                                       \par Cranial nerves:\par CN I deferred. CN  II visual field intact ; fundus exam benign; III, IV, VI: PERRLA, EOM IV: Facial sensation normal B/L to touch, pinprick and temperature VII:Facial strength normal B/L. VIII: Gross hearing intact IX, X: palate midline and elevates symmetrically XI: Trapezius normal strength, XII: Tongue midline without atrophy or fasciculation\par Motor: Muscle tone no rigidity or resistance in all 4 extremities. No atrophy or fasciculation. Muscle strength: arms and legs, proximal and distal flexors and extensors are normal 5/5 . No UE drift.\par Sensory:tactile sensation on left hand illicit a cold sensation \par Coordination: finger to nose: normal. Heel to shin: normal\par Gait: steady normal based ; Romberg test negative \par \par

## 2022-06-29 ENCOUNTER — LABORATORY RESULT (OUTPATIENT)
Age: 57
End: 2022-06-29

## 2022-07-05 LAB
BASOPHILS # BLD AUTO: 0.03 K/UL
BASOPHILS NFR BLD AUTO: 0.4 %
CHOLEST SERPL-MCNC: 151 MG/DL
EOSINOPHIL # BLD AUTO: 0.11 K/UL
EOSINOPHIL NFR BLD AUTO: 1.6 %
HCT VFR BLD CALC: 44.9 %
HDLC SERPL-MCNC: 37 MG/DL
HGB BLD-MCNC: 14.8 G/DL
IMM GRANULOCYTES NFR BLD AUTO: 0.6 %
LDLC SERPL CALC-MCNC: 82 MG/DL
LYMPHOCYTES # BLD AUTO: 3.57 K/UL
LYMPHOCYTES NFR BLD AUTO: 53.2 %
MAN DIFF?: NORMAL
MCHC RBC-ENTMCNC: 31 PG
MCHC RBC-ENTMCNC: 33 GM/DL
MCV RBC AUTO: 93.9 FL
MONOCYTES # BLD AUTO: 0.81 K/UL
MONOCYTES NFR BLD AUTO: 12.1 %
NEUTROPHILS # BLD AUTO: 2.15 K/UL
NEUTROPHILS NFR BLD AUTO: 32.1 %
NONHDLC SERPL-MCNC: 114 MG/DL
PLATELET # BLD AUTO: 138 K/UL
RBC # BLD: 4.78 M/UL
RBC # FLD: 14.8 %
TRIGL SERPL-MCNC: 162 MG/DL
VALPROATE SERPL-MCNC: 60 UG/ML
WBC # FLD AUTO: 6.71 K/UL

## 2022-07-08 LAB
CELIAC DISEASE INTERPRETATION: NORMAL
CELIAC GENE PAIRS PRESENT: NO
DQ ALPHA 1: NORMAL
DQ BETA 1: NORMAL
IMMUNOGLOBULIN A (IGA): 405 MG/DL

## 2022-08-10 LAB
ALBUMIN SERPL ELPH-MCNC: 4 G/DL
ALP BLD-CCNC: 56 U/L
ALT SERPL-CCNC: 21 U/L
ANION GAP SERPL CALC-SCNC: 10 MMOL/L
AST SERPL-CCNC: 24 U/L
BILIRUB SERPL-MCNC: 0.2 MG/DL
BUN SERPL-MCNC: 14 MG/DL
CALCIUM SERPL-MCNC: 9.4 MG/DL
CHLORIDE SERPL-SCNC: 105 MMOL/L
CO2 SERPL-SCNC: 26 MMOL/L
CREAT SERPL-MCNC: 0.92 MG/DL
GLUCOSE SERPL-MCNC: 82 MG/DL
POTASSIUM SERPL-SCNC: 4.6 MMOL/L
PROT SERPL-MCNC: 6.5 G/DL
SODIUM SERPL-SCNC: 141 MMOL/L

## 2022-08-19 ENCOUNTER — APPOINTMENT (OUTPATIENT)
Dept: HEART AND VASCULAR | Facility: CLINIC | Age: 57
End: 2022-08-19

## 2022-08-19 ENCOUNTER — NON-APPOINTMENT (OUTPATIENT)
Age: 57
End: 2022-08-19

## 2022-08-19 VITALS
BODY MASS INDEX: 29.03 KG/M2 | SYSTOLIC BLOOD PRESSURE: 120 MMHG | HEIGHT: 69 IN | DIASTOLIC BLOOD PRESSURE: 70 MMHG | WEIGHT: 196 LBS | HEART RATE: 51 BPM

## 2022-08-19 PROCEDURE — 99214 OFFICE O/P EST MOD 30 MIN: CPT

## 2022-08-19 PROCEDURE — 93000 ELECTROCARDIOGRAM COMPLETE: CPT

## 2022-08-19 NOTE — HISTORY OF PRESENT ILLNESS
[FreeTextEntry1] : 57 MCA CVA May 2021 with some cognitive deficits complicated by seizure in November 2021, s/p ILR (no afib), PFO s/p closure Dec 2021, HTN. \par \par Here to establish care.   Feeling good, no complaints.  \par \par EKG sinus abdi rate 51 bpm.  \par

## 2022-08-19 NOTE — ASSESSMENT
[FreeTextEntry1] : 58 yo M\par \par R MCA Stroke - (May 2021) - Biotronik ILR\par PFO - s/p closure device 25mm Amplatz occluder December 2021 at [facility] Dr. Cardoza\par HTN - controlled\par \par EKG today sinus abdi LAD unchanged \par Echo (jan 2022) - EF 62% - normal LV size, function. mild LVH 1.2 cm. Mildly elevated filling pressures. No evidence of PFO\par KAUR (nov 2021) - EF 60% - normal LV size function and wall thickness. LA appendage is normal. LA is normal in size. Evidence of pfo based on a positive saline study. \par PFO Closure report (dec 2021) - successful PFO closure\par Bilateral LE venous US (aug 2021) - no evidence of DVT - sluggish flow throughout\par Bilateral carotid US (march 2020) - no significant obstructive lesions noted in the extracranial carotid system. \par TST (march 2020) - Normal ST segment response to stress. HTN response (230/93). \par CTA May 2021 severe stenosis or occlusion of the inferior division of the anterior segment of the right middle cerebral artery. normal CT of extracranial circulation. No evidence of carotid stenosis\par \par - asymptomatic sinus bradycardia on ILR, has low resting hr, today sinus abdi rate 51.  No afib on ILR, will continue to monitor.  Avoid AVN blockers.  \par - continue aspirin 81mg\par - lipids acceptable (not fasting), continue lipitor 40mg\par - BP controlled on ramipril 2.5mg daily

## 2022-08-19 NOTE — REASON FOR VISIT
[Initial Visit] : an initial visit for [Symptom and Test Evaluation] : symptom and test evaluation [FreeTextEntry2] : stroke, HTN, PFO, seizures

## 2022-09-06 LAB — VALPROATE SERPL-MCNC: 108 UG/ML

## 2022-09-22 LAB — VALPROATE SERPL-MCNC: 82 UG/ML

## 2022-10-18 ENCOUNTER — RX RENEWAL (OUTPATIENT)
Age: 57
End: 2022-10-18

## 2023-01-18 ENCOUNTER — APPOINTMENT (OUTPATIENT)
Dept: NEUROLOGY | Facility: CLINIC | Age: 58
End: 2023-01-18
Payer: COMMERCIAL

## 2023-01-18 VITALS
BODY MASS INDEX: 28.14 KG/M2 | HEART RATE: 66 BPM | WEIGHT: 190 LBS | HEIGHT: 69 IN | SYSTOLIC BLOOD PRESSURE: 118 MMHG | DIASTOLIC BLOOD PRESSURE: 80 MMHG

## 2023-01-18 PROCEDURE — 99214 OFFICE O/P EST MOD 30 MIN: CPT

## 2023-01-18 NOTE — PHYSICAL EXAM
[FreeTextEntry1] : \par Mental status:\par Orientation: Alert , oriented to month, day of week, year, location                                                                           Speech is fluent , able to name objects, repeat a sentence and write a sentence                                                                                                                                                                                       \par Cranial nerves:\par CN I deferred. CN  II visual field intact ; fundus exam benign; III, IV, VI: PERRLA, EOM IV: Facial sensation normal B/L to touch, pinprick and temperature VII:Facial strength normal B/L. VIII: Gross hearing intact IX, X: palate midline and elevates symmetrically XI: Trapezius normal strength, XII: Tongue midline without atrophy or fasciculation\par Motor: Muscle tone no rigidity or resistance in all 4 extremities. No atrophy or fasciculation. Muscle strength: arms and legs, proximal and distal flexors and extensors are normal 5/5 . No UE drift.\par Sensory:impaired LUE sensation \par Coordination: finger to nose: normal. Heel to shin: normal\par Gait: steady normal based ; Romberg test negative \par \par

## 2023-01-18 NOTE — HISTORY OF PRESENT ILLNESS
[FreeTextEntry1] : Josemanuel Minaya is a 57 year old man presenting for a follow up appointment for right MCA embolic stroke on 5/7/21.\par No seizures noted since last appointment by him or his family. Currently on Depakote 1000 mg per day alternating with 1500 mg per day the following day. No sedation noted on current alternating dose. His mood and agitation has improved with Depakote. He is frustrated by his erectile dysfunction and has tried various agents to no avail. He tends to get exhausted after tasks that require him to concentrate or multitask. \par \par He has been noting intermittent brief  jabs of sharp pain in his left arm. He has intermittent aching pain in left shoulder. He also has had infrequent "jack horse "like pain in left calf. He is no longer in OT but he has been noticing he is unknowingly dropping things out of left hand and not noticing liquid spilling on left side of his body. \par \par

## 2023-01-18 NOTE — ASSESSMENT
[FreeTextEntry1] : 57 year old with minimal vascular risk factors sustained cryptogenic embolic right mca stroke with residual impairment in executive cognitive functioning and emotional lability.\par He had a focal seizure for which he is on Depakote which also assists in mood lability post-stroke. \par \par 1) stroke prevention- s/p PFO closure continue Aspirin 81mg as per Dr. Cardoza, ILR in place but thus far no Afib/flutter.LDL   < 70 mg dl, continue statin but will check Lipid panel  . Resume OT for reemergence of left side neglect symptoms\par \par 2) Seizure prevention - continue Depakote 1000 mg/1500 mg alternating regiman but will check depakote level and  CMP to assure no dose adjustment needed \par \par f/u in 6 months \par

## 2023-01-18 NOTE — REASON FOR VISIT
[Follow-Up: _____] : a [unfilled] follow-up visit [FreeTextEntry1] : FOllow Up- pain in left arm and hand, pain in left foot

## 2023-01-18 NOTE — DATA REVIEWED
[de-identified] : MRI brain showed acute right mca ischemic infarct\par CTA head showed abrupt cutoff of right M3 but no proximal artery stenosis  [de-identified] : 1/31/22\par Routine and ambulatory EEG-abundant right central and temporal slowing.

## 2023-01-24 DIAGNOSIS — R79.89 OTHER SPECIFIED ABNORMAL FINDINGS OF BLOOD CHEMISTRY: ICD-10-CM

## 2023-01-24 LAB
ALBUMIN SERPL ELPH-MCNC: 3.8 G/DL
ALP BLD-CCNC: 53 U/L
ALT SERPL-CCNC: 53 U/L
ANION GAP SERPL CALC-SCNC: 15 MMOL/L
AST SERPL-CCNC: 53 U/L
BILIRUB SERPL-MCNC: 0.5 MG/DL
BUN SERPL-MCNC: 12 MG/DL
CALCIUM SERPL-MCNC: 9.5 MG/DL
CHLORIDE SERPL-SCNC: 105 MMOL/L
CHOLEST SERPL-MCNC: 141 MG/DL
CO2 SERPL-SCNC: 22 MMOL/L
CREAT SERPL-MCNC: 0.85 MG/DL
EGFR: 100 ML/MIN/1.73M2
GLUCOSE SERPL-MCNC: 96 MG/DL
HDLC SERPL-MCNC: 36 MG/DL
LDLC SERPL CALC-MCNC: 81 MG/DL
NONHDLC SERPL-MCNC: 105 MG/DL
POTASSIUM SERPL-SCNC: 4.8 MMOL/L
PROT SERPL-MCNC: 6.6 G/DL
SODIUM SERPL-SCNC: 142 MMOL/L
TRIGL SERPL-MCNC: 122 MG/DL
VALPROATE SERPL-MCNC: 93 UG/ML

## 2023-01-24 PROCEDURE — 99446 NTRPROF PH1/NTRNET/EHR 5-10: CPT

## 2023-01-24 RX ORDER — DIVALPROEX SODIUM 250 MG/1
250 TABLET, DELAYED RELEASE ORAL
Qty: 30 | Refills: 0 | Status: DISCONTINUED | COMMUNITY
Start: 2022-08-29 | End: 2023-01-24

## 2023-01-24 RX ORDER — DIVALPROEX SODIUM 500 MG/1
500 TABLET, DELAYED RELEASE ORAL EVERY OTHER DAY
Qty: 45 | Refills: 2 | Status: DISCONTINUED | COMMUNITY
Start: 2023-01-24 | End: 2023-01-24

## 2023-02-02 ENCOUNTER — RX RENEWAL (OUTPATIENT)
Age: 58
End: 2023-02-02

## 2023-02-27 ENCOUNTER — APPOINTMENT (OUTPATIENT)
Dept: PAIN MANAGEMENT | Facility: CLINIC | Age: 58
End: 2023-02-27
Payer: COMMERCIAL

## 2023-02-27 VITALS
TEMPERATURE: 97.6 F | DIASTOLIC BLOOD PRESSURE: 80 MMHG | SYSTOLIC BLOOD PRESSURE: 120 MMHG | WEIGHT: 190 LBS | HEIGHT: 69 IN | BODY MASS INDEX: 28.14 KG/M2

## 2023-02-27 DIAGNOSIS — M25.512 PAIN IN LEFT SHOULDER: ICD-10-CM

## 2023-02-27 DIAGNOSIS — G89.29 PAIN IN LEFT SHOULDER: ICD-10-CM

## 2023-02-27 PROCEDURE — 99214 OFFICE O/P EST MOD 30 MIN: CPT | Mod: 25

## 2023-02-27 PROCEDURE — 20611 DRAIN/INJ JOINT/BURSA W/US: CPT | Mod: LT

## 2023-02-27 NOTE — DATA REVIEWED
[FreeTextEntry1] : MRI CERVICAL SPINE (02/20/19):\par \par  There is a mild levoscoliosis of the lower cervical and upper thoracic spine and there is\par straightening of the cervical lordosis. The vertebral column alignment is grossly maintained. The\par marrow signal is overall within normal limits with no focal marrow replacing lesion identified. The\par vertebral body heights are maintained and there is no evidence of acute fracture or subluxation.\par There is very mild degenerative endplate edema at C5-6 and C6-7. There is otherwise no abnormal\par vertebral or paraspinal edema. The visualized paraspinal soft tissues appear grossly unremarkable.\par \par  No abnormal signal is identified in the cervical spinal cord. The visualized posterior fossa\par structures are unremarkable.\par \par  C2-3: No significant disc bulge or herniation. Bilateral facet hypertrophy. No significant central\par canal stenosis. Mild bilateral foraminal stenosis.\par \par  C3-4: Minimal disc bulge. Bilateral uncovertebral osteophyte formation, right greater than left. No\par significant central canal stenosis. Moderate/severe right foraminal stenosis and moderate left\par foraminal stenosis.\par \par  C4-5: Mild disc bulge and mild uncovertebral productive changes. No significant central canal\par stenosis. Moderate bilateral foraminal stenosis.\par \par  C5-6: Mild disc bulge with marginal and uncovertebral osteophyte formation. No significant central\par canal stenosis. Severe right foraminal stenosis and moderate/severe left foraminal stenosis.\par \par  C6-7: Mild disc bulge with marginal and uncovertebral osteophyte formation. Mild central canal\par stenosis. Severe bilateral foraminal stenosis.\par \par  C7-T1: No significant disc bulge or herniation. No significant central canal or foraminal stenosis.\par \par \par \par  IMPRESSION:\par \par  Cervical spondylosis. Mild central canal stenosis C6-7. Significant multilevel foraminal stenosis\par as above.

## 2023-02-27 NOTE — HISTORY OF PRESENT ILLNESS
[2] : 2. What number best describes how, during the past week, pain has interfered with your enjoyment of life? 2/10 pain [3] : a maximum pain level of 3/10 [Sharp] : sharp [Aching] : aching [Throbbing] : throbbing [Sitting] : sitting [Rest] : rest [6] : 3. What number best describes how, during the past week, pain has interfered with your general activity? 6/10 pain [FreeTextEntry1] : Interval History:\par Patient returns today. He is s/p stroke and does have some residual deficit. He is here with severe left shoulder pain. Quality of life is impaired. There has been a severe exacerbation of the patient's chronic pain. His neck pain has resolved. Wishes for intervention today.\par \par As per my note dated 12/05/19: "Patient returns to office today. He reports only short-term relief from his epidural steroid injection. Quality of life remained significantly impaired. Pain radiates down the arm to the elbow. Worst pain is in the upper trapezius area. No relief with acetaminophen or muscle relaxants. Pain is worse with activity. Pain improves with rest. Quality of life is significantly impaired."\par \par Interval Note on 11/13/19. Patient reports that pain has returned and is severe in intensity. Pain is in the right upper neck and into the arm. He had 100% pain relief since previous injection. Pain is worst w/ sitting. Relieved when he lies on his stomach. Desires repeat intervention.\par \par As per Dr. Ahuja" 4/17/19: s/p  C7-T1 interlaminar epidural steroid injection 4/17/19 with improvement in upper back shoulder and arm pain.  Does complain of mild tingling in his fingers.  \par Since last visit the pain is improved. Denies any additional weakness, numbness, bowel/bladder dysfunction.  Pain intensity is 1/10\par \par \par HPI\par \par Mr. MIRYAM SOSA is a 54 year M with pmhx of bilateral knee arthroscopic repair presents with left scapulothoracic pain\par \par \par Previous and current pain medications/doses/effects:\par \par Medrol pack without relief\par \par Previous Pain Treatments:\par \par Massage and PT without relief\par Acupuncture without relief\par \par Previous Pain Injections:\par C7-T1 interlaminar epidural steroid injection 4/17/19\par  left scapulothoracic steroid injection 2/14/19\par \par Previous Diagnostic Studies/Images:\par \par MRI CS 2/2019\par \par There is a mild levoscoliosis of the lower cervical and upper thoracic spine and there is\par straightening of the cervical lordosis. The vertebral column alignment is grossly maintained. The\par marrow signal is overall within normal limits with no focal marrow replacing lesion identified. The\par vertebral body heights are maintained and there is no evidence of acute fracture or subluxation.\par There is very mild degenerative endplate edema at C5-6 and C6-7. There is otherwise no abnormal\par vertebral or paraspinal edema. The visualized paraspinal soft tissues appear grossly unremarkable.\par \par  No abnormal signal is identified in the cervical spinal cord. The visualized posterior fossa\par structures are unremarkable.\par \par  C2-3: No significant disc bulge or herniation. Bilateral facet hypertrophy. No significant central\par canal stenosis. Mild bilateral foraminal stenosis.\par \par  C3-4: Minimal disc bulge. Bilateral uncovertebral osteophyte formation, right greater than left. No\par significant central canal stenosis. Moderate/severe right foraminal stenosis and moderate left\par foraminal stenosis.\par \par  C4-5: Mild disc bulge and mild uncovertebral productive changes. No significant central canal\par stenosis. Moderate bilateral foraminal stenosis.\par \par  C5-6: Mild disc bulge with marginal and uncovertebral osteophyte formation. No significant central\par canal stenosis. Severe right foraminal stenosis and moderate/severe left foraminal stenosis.\par \par  C6-7: Mild disc bulge with marginal and uncovertebral osteophyte formation. Mild central canal\par stenosis. Severe bilateral foraminal stenosis.\par \par  C7-T1: No significant disc bulge or herniation. No significant central canal or foraminal stenosis.\par \par \par \par  IMPRESSION:\par \par  Cervical spondylosis. Mild central canal stenosis C6-7. Significant multilevel foraminal stenosis\par as above.\par \par MRI TS 2/2019\par \par There is a mild dextroscoliosis of the thoracic spine and there is mild increase in the thoracic\par kyphosis. The vertebral column alignment is overall maintained. The marrow signal is normal with no\par focal marrow replacing lesion identified. The vertebral body heights are maintained and there is no\par evidence of acute fracture or subluxation. Several small Schmorl's nodes are noted throughout the\par thoracic spine. There is no abnormal vertebral or paraspinal edema. The visualized paraspinal soft\par tissues appear grossly unremarkable.\par \par  No abnormal signal is identified in the thoracic spinal cord. The conus medullaris terminates at\par L1.\par \par  There are degenerative changes of the thoracic spine including mild multilevel disc space narrowing\par in its rule out endplate osteophyte formation as well as mild to moderate multilevel facet\par arthropathy. There is mildly prominent dorsal epidural fat extending from T3-T9.\par \par  At T3-4, there are small bilateral paracentral disc protrusions.\par \par  At T4-5, there is a small left paracentral disc protrusion mildly impinging upon the left ventral\par spinal cord.\par \par  At T5-6, there is a small right paracentral disc protrusion touching the right ventral spinal cord\par without compression\par \par  At T6-7, there is a tiny right paracentral disc protrusion.\par \par  At T7-8 and T8-9, there is mild disc bulging with mild encroachment upon the ventral spinal cord.\par \par  At T10-11, there is a moderate right paracentral disc protrusion mildly encroaching upon the right\par ventral spinal cord.\par \par  There is mild central canal stenosis at T4-5, T7-8, T8-9, and T10-11. There is mild foraminal\par stenosis at T9-10 and T10-11 on the right.\par \par \par \par  IMPRESSION:\par \par  Thoracic scoliosis and spondylosis with numerous superimposed disc herniations resulting in mild\par central canal and foraminal stenosis, as detailed above.\par \par \par MRI R Shoulder\par \par Mild/moderate supraspinatus tendinosis with superimposed low-grade partial-thickness articular   \par surface tearing of the anterior most insertional fibers.  \par  \par  Mild infraspinatus and subscapularis tendinosis without evidence for tear..  \par  \par  Small subacromial enthesophyte, for which evaluation for associated subacromial impingement on   \par physical examination is recommended."  \par  \par  [FreeTextEntry7] : left scapulothoracic pain [de-identified] : Gripping [FreeTextEntry4] : stretching [FreeTextEntry2] : 18

## 2023-02-27 NOTE — PHYSICAL EXAM
[Normal muscle bulk without asymmetry] : normal muscle bulk without asymmetry [Spine: Flexion to ___ degrees, without pain] : spine: flexion to [unfilled] degrees, without pain [] : Motor: [NL] : normal and symmetric bilaterally [Normal] : Normal affect [de-identified] : Constitutional: Well-developed, in no acute distress\par \par Skin: Inspection normal, no bruising noted\par Musculoskeletal:\par Cervical Spine:   \par Gait: Antalgic\par Inspection: Normal curvature, no abnormal kyphosis or scoliosis\par \par Facet loading: pain bilaterally\par \par Palpation:\par Cervical paraspinal muscles: pain bilaterally\par Pain with extension\par 		\par Muscle Strength:\par Deltoid: 5/5 bilaterally\par Biceps: 5/5 bilaterally\par Triceps: 5/5 bilaterally\par Adductor pollicis: 5/5 bilaterally\par \par Sensation: normal and equal in bilateral upper extremities\par \par Reflexes:\par Biceps (C5): 2+ bilaterally\par Brachioradialis (C6): 2+ bilaterally\par Triceps (C7): 2+ bilaterally\par \par Extremity: no edema noted\par Neurological: Memory normal, AAO x 3, Cranial nerves II - XII grossly normal\par Psychiatric: Appropriate mood and affect, oriented to time, place, person, and situation\par \par \par

## 2023-02-27 NOTE — ASSESSMENT
[FreeTextEntry1] : 58 yom presents w/ severe neck and left arm pain which has resolved after treatment but now with severe left shoulder pain after stroke. Quality of life is impaired. There has been a severe exacerbation of the patient's chronic pain.\par \par Left shoulder intraarticular steroid injection under ultrasound guidance today. A high frequency linear probe was used to identify the space. A 25 g 1.5 inch needle was used to administer 40 mg depomedrol and 3 cc of 1% lidocaine into the glenohumeral joint. Patient monitored afterward with no ill effects.\par \par Physical therapy prescribed - goal will be to increase ROM, strengthening, postural training, other modalities ad cady which may include massage and stim. Goals of therapy discussed with the patient in detail and will be discussed with physical therapist. Patient will follow-up following course of physical therapy to monitor progress and adjust therapy as needed.\par \par Acetaminophen 1,000 mg q8h prn for moderate pain. Risks, benefits, and alternatives of acetaminophen discussed with patient.\par \par Ibuprofen 600 mg q8h prn add when pain is not adequately controlled with acetaminophen. Risks, benefits, and alternatives of ibuprofen discussed with patient.\par \par Diet and nutritional strategies discussed which may improve patients pain and will improve overall health.\par \par Gabapentin for neuropathic pain.

## 2023-02-28 LAB
ALBUMIN SERPL ELPH-MCNC: 3.9 G/DL
ALP BLD-CCNC: 55 U/L
ALT SERPL-CCNC: 47 U/L
ANION GAP SERPL CALC-SCNC: 10 MMOL/L
AST SERPL-CCNC: 58 U/L
BILIRUB SERPL-MCNC: 0.5 MG/DL
BUN SERPL-MCNC: 10 MG/DL
CALCIUM SERPL-MCNC: 8.8 MG/DL
CHLORIDE SERPL-SCNC: 104 MMOL/L
CO2 SERPL-SCNC: 25 MMOL/L
CREAT SERPL-MCNC: 0.79 MG/DL
EGFR: 102 ML/MIN/1.73M2
GLUCOSE SERPL-MCNC: 91 MG/DL
POTASSIUM SERPL-SCNC: 4.6 MMOL/L
PROT SERPL-MCNC: 6.4 G/DL
SODIUM SERPL-SCNC: 139 MMOL/L

## 2023-03-03 ENCOUNTER — NON-APPOINTMENT (OUTPATIENT)
Age: 58
End: 2023-03-03

## 2023-03-03 ENCOUNTER — APPOINTMENT (OUTPATIENT)
Dept: HEART AND VASCULAR | Facility: CLINIC | Age: 58
End: 2023-03-03
Payer: COMMERCIAL

## 2023-03-03 VITALS
OXYGEN SATURATION: 98 % | SYSTOLIC BLOOD PRESSURE: 125 MMHG | DIASTOLIC BLOOD PRESSURE: 80 MMHG | WEIGHT: 190 LBS | HEIGHT: 69 IN | BODY MASS INDEX: 28.14 KG/M2 | HEART RATE: 97 BPM

## 2023-03-03 DIAGNOSIS — R56.9 UNSPECIFIED CONVULSIONS: ICD-10-CM

## 2023-03-03 PROCEDURE — 99214 OFFICE O/P EST MOD 30 MIN: CPT

## 2023-03-03 PROCEDURE — 93000 ELECTROCARDIOGRAM COMPLETE: CPT

## 2023-03-03 RX ORDER — DIVALPROEX SODIUM 500 MG/1
500 TABLET, DELAYED RELEASE ORAL
Refills: 0 | Status: DISCONTINUED | COMMUNITY
End: 2023-03-03

## 2023-03-03 NOTE — HISTORY OF PRESENT ILLNESS
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 \par \par Here to establish care.   Feeling good, no complaints.  \par \par 3/3/23: feeling good, no further strokes/seizures.  no cp/sob/palps/.  Back in occupational therapy.  \par \par \par \par \par

## 2023-03-03 NOTE — ASSESSMENT
[FreeTextEntry1] : 57 yo M\par \par R MCA Stroke - (May 2021) - Biotronik ILR\par PFO - s/p closure device 25mm Amplatz occluder December 2021 at [facility] Dr. Cardoza\par HTN - controlled\par \par EKG today sinus abdi rate 59 LAD unchanged \par Echo (jan 2022) - EF 62% - normal LV size, function. mild LVH 1.2 cm. Mildly elevated filling pressures. No evidence of PFO\par KAUR (nov 2021) - EF 60% - normal LV size function and wall thickness. LA appendage is normal. LA is normal in size. Evidence of pfo based on a positive saline study. \par PFO Closure report (dec 2021) - successful PFO closure\par Bilateral LE venous US (aug 2021) - no evidence of DVT - sluggish flow throughout\par Bilateral carotid US (march 2020) - no significant obstructive lesions noted in the extracranial carotid system. \par TST (march 2020) - Normal ST segment response to stress. HTN response (230/93). \par CTA May 2021 severe stenosis or occlusion of the inferior division of the anterior segment of the right middle cerebral artery. normal CT of extracranial circulation. No evidence of carotid stenosis\par \par - asymptomatic sinus bradycardia on ILR.  No afib on ILR, will continue to monitor.  Avoid AVN blockers.  \par - continue aspirin 81mg\par - Mildly elevated LFTs < 2 ULN on repeat measurements, may be due to depakote, he is also on lipitor 40mg which may need to be reduced.  Recommended for pt to follow up with neurology.  \par - BP controlled on ramipril 2.5mg daily.

## 2023-03-06 ENCOUNTER — NON-APPOINTMENT (OUTPATIENT)
Age: 58
End: 2023-03-06

## 2023-04-13 ENCOUNTER — APPOINTMENT (OUTPATIENT)
Dept: PAIN MANAGEMENT | Facility: CLINIC | Age: 58
End: 2023-04-13
Payer: COMMERCIAL

## 2023-04-13 VITALS
BODY MASS INDEX: 28.14 KG/M2 | WEIGHT: 190 LBS | HEIGHT: 69 IN | SYSTOLIC BLOOD PRESSURE: 117 MMHG | DIASTOLIC BLOOD PRESSURE: 67 MMHG

## 2023-04-13 DIAGNOSIS — M79.10 MYALGIA, UNSPECIFIED SITE: ICD-10-CM

## 2023-04-13 PROCEDURE — 20552 NJX 1/MLT TRIGGER POINT 1/2: CPT

## 2023-04-13 PROCEDURE — 99214 OFFICE O/P EST MOD 30 MIN: CPT | Mod: 25

## 2023-04-13 NOTE — PHYSICAL EXAM
[Normal muscle bulk without asymmetry] : normal muscle bulk without asymmetry [Spine: Flexion to ___ degrees, without pain] : spine: flexion to [unfilled] degrees, without pain [] : Motor: [NL] : normal and symmetric bilaterally [Normal] : Normal affect [de-identified] : Constitutional: Well-developed, in no acute distress\par \par Skin: Inspection normal, no bruising noted\par Musculoskeletal:\par Cervical Spine:   \par Gait: Antalgic\par Inspection: Normal curvature, no abnormal kyphosis or scoliosis\par \par Facet loading: pain bilaterally\par \par Palpation:\par Cervical paraspinal muscles: pain bilaterally\par Pain with extension\par 		\par Muscle Strength:\par Deltoid: 5/5 bilaterally\par Biceps: 5/5 bilaterally\par Triceps: 5/5 bilaterally\par Adductor pollicis: 5/5 bilaterally\par \par Sensation: normal and equal in bilateral upper extremities\par \par Reflexes:\par Biceps (C5): 2+ bilaterally\par Brachioradialis (C6): 2+ bilaterally\par Triceps (C7): 2+ bilaterally\par \par Extremity: no edema noted\par Neurological: Memory normal, AAO x 3, Cranial nerves II - XII grossly normal\par Psychiatric: Appropriate mood and affect, oriented to time, place, person, and situation\par \par \par

## 2023-04-13 NOTE — HISTORY OF PRESENT ILLNESS
[2] : 2. What number best describes how, during the past week, pain has interfered with your enjoyment of life? 2/10 pain [3] : a maximum pain level of 3/10 [Sharp] : sharp [Aching] : aching [Throbbing] : throbbing [Sitting] : sitting [Rest] : rest [6] : 3. What number best describes how, during the past week, pain has interfered with your general activity? 6/10 pain [FreeTextEntry1] : Interval History:\par Patient returns today. He is s/p stroke and does have some residual deficit. He is here with severe left shoulder pain which improved after injection. Now has trigger point pain in his muscles likely related to stroke. \par \par As per my note dated 12/05/19: "Patient returns to office today. He reports only short-term relief from his epidural steroid injection. Quality of life remained significantly impaired. Pain radiates down the arm to the elbow. Worst pain is in the upper trapezius area. No relief with acetaminophen or muscle relaxants. Pain is worse with activity. Pain improves with rest. Quality of life is significantly impaired."\par \par Interval Note on 11/13/19. Patient reports that pain has returned and is severe in intensity. Pain is in the right upper neck and into the arm. He had 100% pain relief since previous injection. Pain is worst w/ sitting. Relieved when he lies on his stomach. Desires repeat intervention.\par \par As per Dr. Ahuja" 4/17/19: s/p  C7-T1 interlaminar epidural steroid injection 4/17/19 with improvement in upper back shoulder and arm pain.  Does complain of mild tingling in his fingers.  \par Since last visit the pain is improved. Denies any additional weakness, numbness, bowel/bladder dysfunction.  Pain intensity is 1/10\par \par \par HPI\par \par Mr. MIRYAM SOSA is a 54 year M with pmhx of bilateral knee arthroscopic repair presents with left scapulothoracic pain\par \par \par Previous and current pain medications/doses/effects:\par \par Medrol pack without relief\par \par Previous Pain Treatments:\par \par Massage and PT without relief\par Acupuncture without relief\par \par Previous Pain Injections:\par C7-T1 interlaminar epidural steroid injection 4/17/19\par  left scapulothoracic steroid injection 2/14/19\par \par Previous Diagnostic Studies/Images:\par \par MRI CS 2/2019\par \par There is a mild levoscoliosis of the lower cervical and upper thoracic spine and there is\par straightening of the cervical lordosis. The vertebral column alignment is grossly maintained. The\par marrow signal is overall within normal limits with no focal marrow replacing lesion identified. The\par vertebral body heights are maintained and there is no evidence of acute fracture or subluxation.\par There is very mild degenerative endplate edema at C5-6 and C6-7. There is otherwise no abnormal\par vertebral or paraspinal edema. The visualized paraspinal soft tissues appear grossly unremarkable.\par \par  No abnormal signal is identified in the cervical spinal cord. The visualized posterior fossa\par structures are unremarkable.\par \par  C2-3: No significant disc bulge or herniation. Bilateral facet hypertrophy. No significant central\par canal stenosis. Mild bilateral foraminal stenosis.\par \par  C3-4: Minimal disc bulge. Bilateral uncovertebral osteophyte formation, right greater than left. No\par significant central canal stenosis. Moderate/severe right foraminal stenosis and moderate left\par foraminal stenosis.\par \par  C4-5: Mild disc bulge and mild uncovertebral productive changes. No significant central canal\par stenosis. Moderate bilateral foraminal stenosis.\par \par  C5-6: Mild disc bulge with marginal and uncovertebral osteophyte formation. No significant central\par canal stenosis. Severe right foraminal stenosis and moderate/severe left foraminal stenosis.\par \par  C6-7: Mild disc bulge with marginal and uncovertebral osteophyte formation. Mild central canal\par stenosis. Severe bilateral foraminal stenosis.\par \par  C7-T1: No significant disc bulge or herniation. No significant central canal or foraminal stenosis.\par \par \par \par  IMPRESSION:\par \par  Cervical spondylosis. Mild central canal stenosis C6-7. Significant multilevel foraminal stenosis\par as above.\par \par MRI TS 2/2019\par \par There is a mild dextroscoliosis of the thoracic spine and there is mild increase in the thoracic\par kyphosis. The vertebral column alignment is overall maintained. The marrow signal is normal with no\par focal marrow replacing lesion identified. The vertebral body heights are maintained and there is no\par evidence of acute fracture or subluxation. Several small Schmorl's nodes are noted throughout the\par thoracic spine. There is no abnormal vertebral or paraspinal edema. The visualized paraspinal soft\par tissues appear grossly unremarkable.\par \par  No abnormal signal is identified in the thoracic spinal cord. The conus medullaris terminates at\par L1.\par \par  There are degenerative changes of the thoracic spine including mild multilevel disc space narrowing\par in its rule out endplate osteophyte formation as well as mild to moderate multilevel facet\par arthropathy. There is mildly prominent dorsal epidural fat extending from T3-T9.\par \par  At T3-4, there are small bilateral paracentral disc protrusions.\par \par  At T4-5, there is a small left paracentral disc protrusion mildly impinging upon the left ventral\par spinal cord.\par \par  At T5-6, there is a small right paracentral disc protrusion touching the right ventral spinal cord\par without compression\par \par  At T6-7, there is a tiny right paracentral disc protrusion.\par \par  At T7-8 and T8-9, there is mild disc bulging with mild encroachment upon the ventral spinal cord.\par \par  At T10-11, there is a moderate right paracentral disc protrusion mildly encroaching upon the right\par ventral spinal cord.\par \par  There is mild central canal stenosis at T4-5, T7-8, T8-9, and T10-11. There is mild foraminal\par stenosis at T9-10 and T10-11 on the right.\par \par \par \par  IMPRESSION:\par \par  Thoracic scoliosis and spondylosis with numerous superimposed disc herniations resulting in mild\par central canal and foraminal stenosis, as detailed above.\par \par \par MRI R Shoulder\par \par Mild/moderate supraspinatus tendinosis with superimposed low-grade partial-thickness articular   \par surface tearing of the anterior most insertional fibers.  \par  \par  Mild infraspinatus and subscapularis tendinosis without evidence for tear..  \par  \par  Small subacromial enthesophyte, for which evaluation for associated subacromial impingement on   \par physical examination is recommended."  \par  \par  [FreeTextEntry7] : left scapulothoracic pain [de-identified] : Gripping [FreeTextEntry4] : stretching [FreeTextEntry2] : 18

## 2023-04-13 NOTE — ASSESSMENT
[FreeTextEntry1] : 58 yom presents w/ severe neck and left arm pain which has resolved after treatment but now with severe left shoulder pain after stroke which improved after shoulder injection but now trigger point muscular pain.\par \par Patients shoulder was prepped and draped in sterile fashion. Chloroprep was used to prep this skin. Following this the muscle spasms were palpated. a 25 gauge needle was used to administer a total of 10 cc of 0.25% bupivacaine in the trapezius muscle. Patient was monitored afterwards and had no adverse events.\par \par \par Physical therapy prescribed - goal will be to increase ROM, strengthening, postural training, other modalities ad cady which may include massage and stim. Goals of therapy discussed with the patient in detail and will be discussed with physical therapist. Patient will follow-up following course of physical therapy to monitor progress and adjust therapy as needed.\par \par Acetaminophen 1,000 mg q8h prn for moderate pain. Risks, benefits, and alternatives of acetaminophen discussed with patient.\par \par Ibuprofen 600 mg q8h prn add when pain is not adequately controlled with acetaminophen. Risks, benefits, and alternatives of ibuprofen discussed with patient.\par \par Diet and nutritional strategies discussed which may improve patients pain and will improve overall health.\par \par Gabapentin for neuropathic pain.

## 2023-04-18 ENCOUNTER — NON-APPOINTMENT (OUTPATIENT)
Age: 58
End: 2023-04-18

## 2023-04-18 LAB
ALBUMIN SERPL ELPH-MCNC: 4.2 G/DL
ALP BLD-CCNC: 48 U/L
ALT SERPL-CCNC: 32 U/L
ANION GAP SERPL CALC-SCNC: 14 MMOL/L
AST SERPL-CCNC: 32 U/L
BILIRUB SERPL-MCNC: 0.6 MG/DL
BUN SERPL-MCNC: 12 MG/DL
CALCIUM SERPL-MCNC: 9.8 MG/DL
CHLORIDE SERPL-SCNC: 102 MMOL/L
CO2 SERPL-SCNC: 23 MMOL/L
CREAT SERPL-MCNC: 0.71 MG/DL
EGFR: 105 ML/MIN/1.73M2
GLUCOSE SERPL-MCNC: 93 MG/DL
POTASSIUM SERPL-SCNC: 4.5 MMOL/L
PROT SERPL-MCNC: 6.9 G/DL
SODIUM SERPL-SCNC: 139 MMOL/L

## 2023-05-15 NOTE — ASSESSMENT
Venous Insufficiency    We had a detailed discussion regarding varicose veins and the treatment of venous disease  We discussed the role of compression and other conservative measures for relief of symptoms related to varicose veins  She had a lower extremity arterial duplex study which shows no evidence of arterial occlusive disease  Based on her examination, she has pulses in the feet  Her exam is consistent with evidence of chronic venous insufficiency  We discussed that her symptoms and exam suggest that she would benefit from compression stockings  Recommend compression stockings to be worn daily, regular exercise and weight loss  Otherwise, no surgical intervention is needed at this time  Order for prescription graded compression stockings of 20-30 mm Hg  Wear stocking EVERY day to help control swelling in legs and remove at night  Elevate legs while at rest  Continue healthy life-style changes; heart-healthy, low sodium diet; regular exercise for weight loss  Patient education for venous insufficiency    Follow up in 1 year or sooner, if needed [FreeTextEntry1] : 57 year old with minimal vascular risk factors sustained cryptogenic embolic right mca stroke with residual impairment in executive cognitive functioning and emotional lability.\par He had a focal seizure for which he is on Depakote. He keeps having episodes of cold sensation over left hand while on Depakote and is working with OT. \par \par 1) stroke prevention- s/p PFO closure continue Aspirin 81mg as per Dr. Cardoza, ILR in place but thus far no Afib/flutter.LDL   < 70 mg dl, continue statin but will check Lipid panel  . Starting speech therapy tomorrow\par \par 2) Seizure prevention - continue Depakote 500 mg bid but will check level, CBC and CMP\par \par f/u in 6 months \par

## 2023-07-18 ENCOUNTER — APPOINTMENT (OUTPATIENT)
Dept: NEUROLOGY | Facility: CLINIC | Age: 58
End: 2023-07-18
Payer: COMMERCIAL

## 2023-07-18 VITALS
WEIGHT: 190 LBS | HEIGHT: 69 IN | DIASTOLIC BLOOD PRESSURE: 91 MMHG | SYSTOLIC BLOOD PRESSURE: 148 MMHG | BODY MASS INDEX: 28.14 KG/M2 | HEART RATE: 63 BPM | OXYGEN SATURATION: 98 %

## 2023-07-18 PROCEDURE — 99214 OFFICE O/P EST MOD 30 MIN: CPT

## 2023-07-19 NOTE — PHYSICAL EXAM
[FreeTextEntry1] : Mental status:\par Orientation: Alert , oriented to month, day of week, year, location                                                                           Speech is fluent , able to name objects, repeat a sentence and write a sentence                                                                                                                                                                                       \par Cranial nerves:\par CN I deferred. VFF ; f III, IV, VI: PERRLA, EOM IV: Facial sensation normal B/L to touch, pinprick and temperature VII:Facial strength normal B/L. VIII: Gross hearing intact IX, X: palate midline and elevates symmetrically XI: Trapezius normal strength, XII: Tongue midline without atrophy or fasciculation\par Motor: Muscle tone no rigidity or resistance in all 4 extremities. No atrophy or fasciculation. Muscle strength: arms and legs, proximal and distal flexors and extensors are normal 5/5 . No UE drift.\par Sensory:tactile sensation on left hand illicit a cold sensation \par Coordination: finger to nose: normal. Heel to shin: normal\par Gait: steady normal based ; Romberg test negative \par \par

## 2023-07-19 NOTE — DATA REVIEWED
[de-identified] : MRI brain showed acute right mca ischemic infarct\par CTA head showed abrupt cutoff of right M3 but no proximal artery stenosis

## 2023-07-19 NOTE — HISTORY OF PRESENT ILLNESS
[FreeTextEntry1] : 56 year old male with history of embolic right MCA stroke with residual cognitive deficits primarily affecting his speech of processing , multitasking, and executive dysfunction. Initially he was noted to be very angry and have impulse control issues post stroke which has improved with the addition of Depakote. He had a focal motor seizure described as involuntary clonic jerking of his hand several months post stroke as well. \par \par He remains on Depakote with no complaints\par No new neurologic complaints

## 2023-07-19 NOTE — ASSESSMENT
[FreeTextEntry1] : 57 year old with minimal vascular risk factors sustained cryptogenic embolic right mca stroke with residual impairment in executive cognitive functioning and emotional lability.\par He had a focal seizure for which he is on Depakote which also assists in mood lability post-stroke. \par \par 1) stroke prevention- s/p PFO closure continue Aspirin 81mg as per Dr. Cardoza, ILR in place but thus far no Afib/flutter.LDL   < 70 mg dl, continue statin but will obtain  Lipid panel frm PCP. \par \par 2) Seizure prevention - continue Depakote 1000 mg/1500 mg alternating regiman but will check depakote level\par \par f/u in 12 months \par

## 2023-08-02 NOTE — ASU PREOP CHECKLIST - ISOLATION PRECAUTIONS
[address] [facility] coding opportunities       Chart reviewed, no opportunity found: CHART REVIEWED, NO OPPORTUNITY FOUND        Patients Insurance        Commercial Insurance: Yuriy Masterson
none

## 2023-08-03 ENCOUNTER — RX RENEWAL (OUTPATIENT)
Age: 58
End: 2023-08-03

## 2023-08-09 LAB — VALPROATE SERPL-MCNC: 61 UG/ML

## 2023-09-08 ENCOUNTER — NON-APPOINTMENT (OUTPATIENT)
Age: 58
End: 2023-09-08

## 2023-09-08 ENCOUNTER — APPOINTMENT (OUTPATIENT)
Dept: HEART AND VASCULAR | Facility: CLINIC | Age: 58
End: 2023-09-08
Payer: COMMERCIAL

## 2023-09-08 VITALS
DIASTOLIC BLOOD PRESSURE: 78 MMHG | BODY MASS INDEX: 28.65 KG/M2 | HEART RATE: 69 BPM | SYSTOLIC BLOOD PRESSURE: 126 MMHG | WEIGHT: 194 LBS | OXYGEN SATURATION: 98 %

## 2023-09-08 PROCEDURE — 99214 OFFICE O/P EST MOD 30 MIN: CPT

## 2023-09-08 PROCEDURE — 93000 ELECTROCARDIOGRAM COMPLETE: CPT

## 2023-09-08 NOTE — HISTORY OF PRESENT ILLNESS
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   Here to establish care.   Feeling good, no complaints.    3/3/23: feeling good, no further strokes/seizures.  no cp/sob/palps/.  Back in occupational therapy.    9/8/23: no events, no complaints.  trying to walk more, get more exercise, motivation is limiting factor.

## 2023-09-08 NOTE — ASSESSMENT
[FreeTextEntry1] : 57 yo M  R MCA Stroke - (May 2021) - Biotronik ILR PFO - s/p closure device 25mm Amplatz occluder December 2021 at [facility] Dr. Cardoza HTN - controlled  EKG today sinus rate 61 LAD unchanged poss inferior infarct.  Echo (jan 2022) - EF 62% - normal LV size, function. mild LVH 1.2 cm. Mildly elevated filling pressures. No evidence of PFO KAUR (nov 2021) - EF 60% - normal LV size function and wall thickness. LA appendage is normal. LA is normal in size. Evidence of pfo based on a positive saline study.  PFO Closure report (dec 2021) - successful PFO closure Bilateral LE venous US (aug 2021) - no evidence of DVT - sluggish flow throughout Bilateral carotid US (march 2020) - no significant obstructive lesions noted in the extracranial carotid system.  TST (march 2020) - Normal ST segment response to stress. HTN response (230/93).  CTA May 2021 severe stenosis or occlusion of the inferior division of the anterior segment of the right middle cerebral artery. normal CT of extracranial circulation. No evidence of carotid stenosis  - asymptomatic sinus bradycardia on ILR.  No afib on ILR, will continue to monitor.  Avoid AVN blockers.   EKG today NSR rate 60.   Upcoming trip to [facility] [place] [facility]\"", they were advised to bring their ILR monitor.   - continue aspirin 81mg - continue lipitor 40mg  - BP controlled on ramipril 2.5mg daily.  - f/u 6 months

## 2023-09-21 ENCOUNTER — NON-APPOINTMENT (OUTPATIENT)
Age: 58
End: 2023-09-21

## 2023-10-16 ENCOUNTER — APPOINTMENT (OUTPATIENT)
Dept: GASTROENTEROLOGY | Facility: CLINIC | Age: 58
End: 2023-10-16
Payer: COMMERCIAL

## 2023-10-16 DIAGNOSIS — R19.4 CHANGE IN BOWEL HABIT: ICD-10-CM

## 2023-10-16 DIAGNOSIS — Z12.11 ENCOUNTER FOR SCREENING FOR MALIGNANT NEOPLASM OF COLON: ICD-10-CM

## 2023-10-16 DIAGNOSIS — R10.32 LEFT LOWER QUADRANT PAIN: ICD-10-CM

## 2023-10-16 DIAGNOSIS — K64.8 OTHER HEMORRHOIDS: ICD-10-CM

## 2023-10-16 PROCEDURE — 99215 OFFICE O/P EST HI 40 MIN: CPT

## 2023-10-31 ENCOUNTER — TRANSCRIPTION ENCOUNTER (OUTPATIENT)
Age: 58
End: 2023-10-31

## 2023-11-09 ENCOUNTER — APPOINTMENT (OUTPATIENT)
Dept: HEART AND VASCULAR | Facility: CLINIC | Age: 58
End: 2023-11-09
Payer: COMMERCIAL

## 2023-11-09 ENCOUNTER — NON-APPOINTMENT (OUTPATIENT)
Age: 58
End: 2023-11-09

## 2023-11-09 VITALS
WEIGHT: 190 LBS | OXYGEN SATURATION: 98 % | BODY MASS INDEX: 28.14 KG/M2 | DIASTOLIC BLOOD PRESSURE: 60 MMHG | HEART RATE: 62 BPM | HEIGHT: 69 IN | SYSTOLIC BLOOD PRESSURE: 80 MMHG

## 2023-11-09 VITALS — SYSTOLIC BLOOD PRESSURE: 80 MMHG | DIASTOLIC BLOOD PRESSURE: 60 MMHG

## 2023-11-09 VITALS — SYSTOLIC BLOOD PRESSURE: 90 MMHG | DIASTOLIC BLOOD PRESSURE: 70 MMHG

## 2023-11-09 DIAGNOSIS — R31.9 HEMATURIA, UNSPECIFIED: ICD-10-CM

## 2023-11-09 DIAGNOSIS — I95.9 HYPOTENSION, UNSPECIFIED: ICD-10-CM

## 2023-11-09 PROCEDURE — 93000 ELECTROCARDIOGRAM COMPLETE: CPT

## 2023-11-09 PROCEDURE — 99496 TRANSJ CARE MGMT HIGH F2F 7D: CPT

## 2023-11-09 RX ORDER — RAMIPRIL 2.5 MG/1
2.5 CAPSULE ORAL DAILY
Qty: 90 | Refills: 3 | Status: DISCONTINUED | COMMUNITY
Start: 2023-02-02 | End: 2023-11-09

## 2023-11-09 RX ORDER — METOPROLOL TARTRATE 25 MG/1
25 TABLET, FILM COATED ORAL
Qty: 90 | Refills: 3 | Status: ACTIVE | COMMUNITY
Start: 2023-11-09 | End: 1900-01-01

## 2023-11-16 ENCOUNTER — TRANSCRIPTION ENCOUNTER (OUTPATIENT)
Age: 58
End: 2023-11-16

## 2023-12-05 ENCOUNTER — TRANSCRIPTION ENCOUNTER (OUTPATIENT)
Age: 58
End: 2023-12-05

## 2023-12-15 ENCOUNTER — APPOINTMENT (OUTPATIENT)
Dept: GASTROENTEROLOGY | Facility: HOSPITAL | Age: 58
End: 2023-12-15

## 2024-01-04 ENCOUNTER — APPOINTMENT (OUTPATIENT)
Dept: HEART AND VASCULAR | Facility: CLINIC | Age: 59
End: 2024-01-04
Payer: COMMERCIAL

## 2024-01-04 VITALS
WEIGHT: 188 LBS | DIASTOLIC BLOOD PRESSURE: 82 MMHG | BODY MASS INDEX: 27.76 KG/M2 | OXYGEN SATURATION: 97 % | HEART RATE: 63 BPM | SYSTOLIC BLOOD PRESSURE: 118 MMHG

## 2024-01-04 DIAGNOSIS — Q21.12 PATENT FORAMEN OVALE: ICD-10-CM

## 2024-01-04 DIAGNOSIS — Z87.74 PERSONAL HISTORY OF (CORRECTED) CONGENITAL MALFORMATIONS OF HEART AND CIRCULATORY SYSTEM: ICD-10-CM

## 2024-01-04 PROCEDURE — 93000 ELECTROCARDIOGRAM COMPLETE: CPT

## 2024-01-04 PROCEDURE — 99214 OFFICE O/P EST MOD 30 MIN: CPT | Mod: 25

## 2024-01-04 NOTE — HISTORY OF PRESENT ILLNESS
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   Here to establish care.   Feeling good, no complaints.    3/3/23: feeling good, no further strokes/seizures.  no cp/sob/palps/.  Back in occupational therapy.    9/8/23: no events, no complaints.  trying to walk more, get more exercise, motivation is limiting factor.    11/9/23: Here for post hospital follow up. Presented to [place] [facility] 10/29/23 with chest tightness, bp 156/116. ECG revealed NEW atrial fibrillation with rapid ventricular response. Admitted to ICU with Amiodarone drip. NSR was achieved. Patient downgraded and transitioned to oral rate and bp control with Lopressor 25 mg BID and Lisinopril 10mg. Anticoagulation was started with Eliquis 5mg BID throughout hospital course and restarted on patients' home ASA 81mg. Work up revealed moderate stenosis in mid left anterior descending artery with remaining segments of non-obstructive coronary artery on CTA and L non obstructing and renal calculi measuring 1 centimeter CT A/P. Patient discharged 10/31/23.  Since that time patient is feeling a little dizzy.  He has having some hematuria and pain due to a known kidney stone.     1/4/24:  hematuria resolved, needed temporary ureteral stent, kidney stone removed.   on eliquis and aspirin no bleeding.  no further palpitations.  returns post testing

## 2024-01-04 NOTE — CARDIOLOGY SUMMARY
[de-identified] : Patient Name:	   MIRYAM SOSA		Location:	S	 [facility] Rec #: 	  [id number]		Account #: 	[id number]	 YOB: 1965		Ordering:	Deidra Nick MD	 Age: 58	      Sex:    M		Attending:	Aldo Russo MD	 PCP:	     Frank Cheung MD ______________________________________________________________________________________ 						 Exam Date: 	  10/30/23					 Exam: 	CTA HEART IC					 Order#:	[id number]					              I. Clinical Information: 58 years old Male underwent a coronary CT angiogram for the evaluation of coronary artery disease.    II. Technique: Coronary CTA was performed on a 128-slice cardiac CT scanner with retrospective ECG gating technique.  III. Procedure: ECG-gated CT without contrast material and CT angiography with contrast material of the heart and coronary arteries, including 3D image post processing was performed.  Isovue 370. 100 cc administered. 0 cc discarded.  IV. Radiation Dose Exposure:  DLP: 890 mGy.cm x 0.014 = Effective Dose 12.5 mSv  V. Study Image Quality:  3 [1. The CCTA can be read using one best systolic or diastolic phase.  2. The CCTA requires adjacent systolic or diastolic phases.  3. The CCTA requires multiple phases from both systolic and diastolic phases.  4. Uninterpretable or nondiagnostic study]  VI. Medications:      SL NTG: Yes (0.8 mg sublingual)      Cardizem:  No        Metoprolol:   Yes 10mg IVP   VII. Results:   A: Coronary Artery Calcium Score  Total Agatston Score: 772.   LM Agatston Score: 0. LAD Agatston Score: 279. LCX Agatston Score: 0. RCA Agatston Score: 493.  LOPEZ Percentile Rank: 95 (The observed calcium score of 772 is at percentile 95 for subjects of the same age, gender, and race/ethnicity who are free of clinical cardiovascular disease and treated diabetes.)  B: Coronary Artery CT Angiogram   Coronary Dominance: Right Yes Anomalous coronary artery:  No Coronary fistula: No   Coronary Arteries (segment number):   LM (5): <25% stenosis due noncalcific and calcific plaque  LAD:  Prox (6): Mild stenosis due to noncalcific and calcific plaque Mid (7): Moderate stenosisdue to noncalcific and calcific plaque Distal (8): Non-obstructive D1 (9): Minimal stenosis due to noncalcific and calcific plaque; large branching vessel. D2 (10): Non-obstructive  LCX:  Prox (11): Minimal stenosis due to noncalcific plaque  OM1(12): Normal Mid (13): Minimal stenosis due to noncalcific and calcific plaque  OM2 (14): Non-obstructive   LPDA (15): N/A RI (17): Mild stenosis due to noncalcific and calcific plaque; large branching vessel. LPL (18): N/A  RCA:  Prox (1): Mild stenosis due to calcific plaque Mid (2): Mild stenosis due to noncalcific and calcific plaque   Distal (3): Minimal stenosis due to noncalcific and calcific plaque  RPDA (4): Mild stenosis due to noncalcific and calcific plaque   RPL (16): Minimal stenosis due to noncalcific and calcific plaque    {% area stenosis:   Normal = 0%;  Minimal = 1 to 29%;  Mild = 30 to 49%;  Moderate= 50 to 69%;  Severe= 70 to 90%; Subtotal > or = 91%     } Lesion type:  Calcified;  Noncalcified;  Predominantly Calcified;  Predominantly Noncalcified;  With Remodeling  C: Additional Cardiac Findings  Coarctation: N Patent ductus arteriosus: N Aortic Dissection:  None in the visualized segments of the thoracic aorta. Myocardial structural abnormality: LV:    N   LA:    N   RV:   N   RA:   N   Atrial septum:  Atrial septal occluder device noted, well-seated. No interatrial communication noted. Ventricular septum:  N   Pericardium:  N   Pericardial effusion: N   Myocardial Infarction (myocardial thinning and low-attenuation): N  Incidental cardiac findings:  Aortic calcification.   Non Cardiac Findings:   Limited CT of the CHEST with and without contrast  INDICATION: Chest pain.  TECHNIQUE: CT of the medial aspects of the mid to lower chest was obtained in conjunction with a coronary CT angiogram.  The coronary CT angiogram is dictated separately.  This report pertains solely to the non-coronary portions of the study. Maximum intensity projections (MIPs) were performed.   PRIOR STUDIES: None.    FINDINGS: The heart is normal in size.  No pericardial effusion is seen.   The visualized portions of the aorta are unremarkable.   The visualized portions of the pulmonary arteries are unremarkable. No gross mediastinal or hilar adenopathy identified.  No pleural effusions are evident. The lungs were not entirely imaged.  The visualized portions of the lungs reveal mild dependent atelectasis in the lower lobes..   Mild to moderate degenerative changes are seen in the spine.    IMPRESSION:   Cardiac:  1.  The calcium score is severe at 772 Agatston units, which is at the 95 percentile, adjusted for age, gender and race. 2.  Moderate stenosis in mid LAD .  3.  Remaining segments demonstrate non-obstructive coronary artery disease. 4.  Atrial septal occluder device noted, well-seated. No interatrial communication noted     Non-cardiac:  No significant pulmonary abnormalities are seen.   Caty Armando M.D., Attending Cardiologist Perla Fields M.D., Attending Radiologist  --------------------------------------------------------------------------------------------------------------------  Patient Name:	   MIRYAM SOSA		Location:	[facility] Rec #: 	  [id number]		Account #: 	[id number]	 YOB: 1965		Ordering:	Arvind Patel	 Age: 58	      Sex:    M		Attending:	Aldo Russo MD	 PCP:	     Frank Cheung MD ______________________________________________________________________________________ 						 Exam Date: 	  10/30/23					 Exam: 	CTA CHEST PULM ART [facility]					 Order#:	[id number]					                CLINICAL INFORMATION: New onset A. fib, rule out dissection   COMPARISON: None.  CONTRAST/COMPLICATIONS: IV Contrast: Omnipaque 350  95 cc administered   5 cc discarded Oral Contrast: NONE Complications: None reported at time of study completion   PROCEDURE:  CT Angiography of the Chest, Abdomen and Pelvis.  Precontrast imaging was performed through the chest followed by arterial phase imaging of the chest, abdomen and pelvis. Sagittal and coronal reformats were performed as well as 3D (MIP) reconstructions.  FINDINGS: CHEST:  LUNGS AND LARGE AIRWAYS: Patent central airways. Trace left basilar atelectasis. PLEURA: No pleural effusion. VESSELS: Atherosclerotic changes of the aorta and coronary arteries. No aortic dissection or intramural hematoma. HEART: Heart size is normal. No pericardial effusion. MEDIASTINUM AND ORA: No lymphadenopathy. CHEST WALL AND LOWER NECK: Thyroid gland is heterogeneous.  ABDOMEN AND PELVIS: LIVER: Within normal limits. BILE DUCTS: Normal caliber. GALLBLADDER: Within normal limits. SPLEEN: Within normal limits. PANCREAS: Within normal limits. ADRENALS: Within normal limits. KIDNEYS/URETERS: No hydronephrosis. There is a 1.0 cm nonobstructing stone in the lower pole of the left kidney.  Pelvis is incompletely imaged. BLADDER: Within normal limits. Partially visualized. REPRODUCTIVE ORGANS: Not completely visualized.  BOWEL: No bowel obstruction. Appendix is normal. PERITONEUM: No ascites. VESSELS: Within normal limits. RETROPERITONEUM/LYMPH NODES: No lymphadenopathy.   ABDOMINAL WALL: Tiny fat-containing umbilical hernia. BONES: Chronic deformity of the left lateral sixth rib. Tiny sclerotic focus within the left lateral eighth rib. Multilevel degenerative changes of the spine.   IMPRESSION:   No aortic aneurysm or dissection.  Nonobstructing left intrarenal calculus measuring up to 1 cm.  Additional findings as above.  --- End of Report ---  [TextEntry] :  ****CV TRANSESOPHAGEAL ECHO**** Date: 21  Transesophogeal Echocardiogram Report     Order #:        [id number]   [facility] #:        [id number]   Ht (in):     69         Wt (lb):     186   BMI:         27.5       BSA:         2.04   Cardiologist:             Yuniel Weaver MD   Indications:             Hand Twitching   Medications:             Atorvastatin, Gabapentin, Ramapril   History and Physical Exam:         CVA, Seizures, Hypercholesterolemia, HTN   BP:     142    /   67      HR:   Rhythm:                  NSR   Technical Quality:       Adequate     MEASUREMENTS  (Male / Female) Normal Values   2D ECHO   Aortic Root Diameter              3.3 cm                   Ascending Aorta Diameter          3.1 cm                     Procedural Meds     Complications     Proc. Components     FINDINGS     Left Ventricle   Normal left ventricular size, systolic function and wall thickness, with no    regional wall motion abnormalities. Left ventricular ejection fraction is    calculated using Biplane Modified Edmondson's method at 60%     Right Ventricle   The right ventricle is normal in size and function.     Right Atrium   The right atrium is normal in size.     Pulmonary Venous     Left Atrium   The left atrium is normal in size. Mobile interatrial septum  with evidence of    pfo based on positive agitated saline study x 3.     LA Appendage   The LA appendage is normal.  No thrombus visualized in the left atrial    appendage.     IA Septum     Mitral Valve   Structurally normal mitral valve without significant stenosis or prolapse.     There is trace mitral regurgitation.     Aortic Valve   Structurally normal trileaflet aortic valve without significant sclerosis or    stenosis.  There is no aortic regurgitation.     Tricuspid Valve   Structurally normal tricuspid valve without significant stenosis. There is mild    tricuspid regurgitation.     Pulmonic Valve   Structurally normal pulmonic valve without significant stenosis.  There is no    pulmonic regurgitation.     Pericardium   Normal pericardium without effusion.     Aorta   Normal ascending aorta dimension.     FINAL IMPRESSIONS   Normal left ventricular size, systolic function and wall thickness, with no    regional wall motion abnormalities. Left ventricular ejection fraction is    calculated using Biplane Modified Edmondson's method at 60%  .    The LA appendage is normal.  No thrombus visualized in the left atrial    appendage.    The left atrium is normal in size. Mobile interatrial septum  with evidence of    pfo based on positive agitated saline study x 3.    (This study, KAUR reveals PFO based on positive bubble study as opposed to    negative bubble study on transthoracic echocardiogram)     Yuniel Weaver MD, FACC   (Electronically Signed)   Final Date:      2021 15:24   ~ --------------------------------------------------------------------- ****CV ECHOCARDIOGRAM**** Date: 10/30/23 Patient Info Name:     MIRYAM SOSA Age:     58 years :     1965 Gender:     Male MRN:     [id number] Accession #:     [id number] Ht:     68 in Wt:     197 lb BSA:     2.09 m2 HR:     74 bpm BP:     127 /     73 mmHg Heart Rhythm:     Sinus Rhythm Technical Quality:     Fair Exam Date:     10/30/2023 1:05 PM Exam Location:     [facility] Echo Patient Status:     Inpatient Admit Date:     10/29/2023  Staff Ordering Physician:     Arvind Patel Sonographer:     ALEX Petersen  Exam Type:     CV ECHOCARDIOGRAM  Indications      - new onset afib with chest pain  Complete two-dimensional, color flow and Doppler transthoracic echocardiogram is performed.  Account #:     [id number]  Amiodorone, Eliquis, Lipitro, Gabapentin, Lisinopirl, Metoprolol   Summary   1. Left ventricular systolic function is hyperdynamic with an ejection fraction of >70% by visual estimate.   2. Left ventricular segmental wall motion is grossly normal, however endocardial definition is limited.   3. The left ventricle demonstrates normal diastolic function, normal left atrial pressure.   4. There is mild left ventricular hypertrophy.   5. Right ventricular systolic function appears normal.  Not optimally visualized.   6. Unable to estimate pulmonary artery pressure due to an insufficient tricuspid regurgitation Doppler waveform.   7. No significant valvular abnormalities.  Left Ventricle   Left ventricular chamber dimension is decreased.   There is mild left ventricular hypertrophy.   Left ventricular systolic function is hyperdynamic with an ejection fraction of >70% by visual estimate.   Left ventricular segmental wall motion is grossly normal, however endocardial definition is limited.   The left ventricle demonstrates normal diastolic function, normal left atrial pressure.  Right Ventricle   Right ventricular chamber dimension is not well visualized.   Right ventricular systolic function appears normal.  Not optimally visualized.  Left Atrium   Left atrial chamber dimension is normal.  Right Atrium   Right atrial chamber dimension is normal.  Aortic Valve   The aortic valve is not well visualized.   There is mild aortic valve sclerosis.   There is no aortic valve stenosis with a peak gradient of 8 mmHg, mean gradient of 5 mmHg, and aortic valve area of 2.75 cm2.   There is no aortic valve regurgitation.  Pulmonic Valve   The pulmonic valve is not well visualized.   There is no pulmonic valve stenosis.   There is trace pulmonic regurgitation.  Mitral Valve   The mitral valve has normal leaflets.   There is no mitral valve stenosis.   There is no mitral valve regurgitation.  Tricuspid Valve   The tricuspid valve leaflets are normal.   There is no significant tricuspid valve stenosis.   There is trace tricuspid valve regurgitation.   Unable to estimate pulmonary artery pressure due to an insufficient tricuspid regurgitation Doppler waveform.  Pericardium/Pleural   The pericardium appears normal.   There is no pericardial effusion.  Inferior Vena Cava   Normal inferior vena cava with >50% collapse upon inspiration consistent with normal right atrial pressure, 3 mmHg.  Aorta   The aortic root at the sinus of Valsalva is normal measuring 3.43 cm with an index of 1.64 cm/m2.   The proximal ascending aorta is normal measuring 3.37 cm with an index of 1.61 cm/m2.  Left Ventricular Outflow Tract ---------------------------------------------------------------------- Name                                 Value        Normal ----------------------------------------------------------------------  LVOT 2D ---------------------------------------------------------------------- LVOT Diameter                       1.9 cm                 LVOT Doppler ---------------------------------------------------------------------- LVOT Mean Gradient                  4 mmHg                LVOT VTI                             30 cm                LVOT VTI/AV VTI Ratio                 0.99                LVOT Peak Velocity (Valsalva)                       1.3 m/sec                LVOT Peak Gradient (Valsalva)                          7 mmHg                LVOT Stroke Volume                   82 ml                LVOT CO                          5.7 l/min                LVOT CI                        2.7 L/min/m2  Pulmonic Valve ---------------------------------------------------------------------- Name                                 Value        Normal ----------------------------------------------------------------------  PV Doppler ---------------------------------------------------------------------- PV Peak Velocity                 1.1 m/sec                PV Peak Gradient                    5 mmHg                PV Accel Time                       118 ms  Mitral Valve ---------------------------------------------------------------------- Name                                 Value        Normal ----------------------------------------------------------------------  MV Doppler ---------------------------------------------------------------------- MV Peak Gradient                    3 mmHg                MV Mean Gradient                    1 mmHg                MV PHT                               66 ms                MV Area (PHT)                     3.32 cm2     4.00-5.00  MV Area (Cont Eq VTI)             3.26 cm2                 MV Diastolic Function ---------------------------------------------------------------------- MV E Peak Velocity               0.6 m/sec         <=0.5  MV A Peak Velocity               0.5 m/sec                MV E/A                                1.15     0.00-0.80  MV Decel Time                       228 ms                MV A Wave Duration                  117 ms                 MV Annular TDI ---------------------------------------------------------------------- MV Septal e' Velocity            7.32 cm/s        >=8.00  MV E/e' (Septal)                      8.13        <=8.00  MV Lateral e' Velocity           7.58 cm/s       >=10.00  MV E/e' (Lateral)                     7.85        <=8.00  MV e' Average                    7.45 cm/s                MV E/e' (Average)                     7.99        <14.00  Tricuspid Valve ---------------------------------------------------------------------- Name                                 Value        Normal ----------------------------------------------------------------------  TV Regurgitation Doppler ---------------------------------------------------------------------- TR Peak Velocity                 1.3 m/sec         <=2.8  TR Peak Gradient                    6 mmHg                 Estimated PAP/RSVP ---------------------------------------------------------------------- RA Pressure                         3 mmHg            <8  RV Systolic Pressure                9 mmHg           <36  Aorta ---------------------------------------------------------------------- Name                                 Value        Normal ----------------------------------------------------------------------  Ascending Aorta ---------------------------------------------------------------------- Sinus of Valsalva Diameter          3.4 cm       3.1-3.7  Sinus of Valsalva Index          1.6 cm/m2       1.5-1.9  Prox Asc Ao Diameter                3.4 cm       2.6-3.4  Prox Asc Ao Diameter Index       1.6 cm/m2       1.3-1.7   Thoracic Aorta ---------------------------------------------------------------------- Ao Arch Diameter                    2.2 cm  Venous ---------------------------------------------------------------------- Name                                 Value        Normal ----------------------------------------------------------------------  IVC/SVC ---------------------------------------------------------------------- IVC Diameter (Exp 2D)               1.3 cm         <=2.1  Aortic Valve ---------------------------------------------------------------------- Name                                 Value        Normal ----------------------------------------------------------------------  AV Doppler ---------------------------------------------------------------------- AV Peak Velocity                 1.5 m/sec                AV Peak Gradient                    8 mmHg                AV Mean Gradient                    5 mmHg                AV VTI                               30 cm                AV Area (Cont Eq VTI)             2.75 cm2        >=3.00  AV Area (Cont Eq Jeovanny)             2.48 cm2                 AV Regurgitation 2D ---------------------------------------------------------------------- LVOT Area                         2.77 cm2  Ventricles ---------------------------------------------------------------------- Name                                 Value        Normal ----------------------------------------------------------------------  LV Dimensions 2D/MM ---------------------------------------------------------------------- IVS Diastolic Thickness (2D)       1.27 cm     0.60-1.00  LVID Diastole (2D)                 4.74 cm     4.20-5.80  LVIW Diastolic Thickness (2D)                               1.27 cm     0.60-1.00  LVID Systole (2D)                  2.95 cm     2.50-4.00  LVOT Diameter                      1.88 cm                LV Mass (2D Cubed)                232.61 g  88..00  LV Mass Index (2D Cubed)       111.03 g/m2  49..00  Relative Wall Thickness (2D)          0.54                 LV Fractional Shortening/Ejection Fraction 2D/MM ---------------------------------------------------------------------- LV Fractional Shortening (2D)                                  38 %         25-43  LV EF (2D Teicholz)                   68 %         52-72  LV Diastolic Volume (4C MOD)      43.44 ml                LV EF (4C MOD)                        54 %                LV Diastolic Volume (2C MOD)      39.39 ml                LV EF (2C MOD)                        64 %                LV Diastolic Volume (BP MOD)      41.87 ml  62..00  LV Diastolic Volume Index (BP MOD)                       19.99 ml/m2   34.00-74.00  LV Systolic Volume (BP MOD)       16.85 ml   21.00-61.00  LV Systolic Volume Index (BP MOD)                            8.04 ml/m2   11.00-31.00  LV EF (BP MOD)                        60 %         52-72  LV Diastolic Length (4C)           6.66 cm                LV Systolic Length (4C)            5.62 cm                LV Stroke Volume (4C MOD)         23.65 ml                 RV Dimensions 2D/MM ---------------------------------------------------------------------- RV Basal Diastolic Dimension       2.92 cm     2.50-4.10  RV Mid-Cavity Diastolic Dimension                          2.14 cm     1.90-3.50  TAPSE                              1.88 cm        >=1.70  Atria ---------------------------------------------------------------------- Name                                 Value        Normal ----------------------------------------------------------------------  LA Dimensions ---------------------------------------------------------------------- LA Dimension (2D)                  3.90 cm     3.00-4.10  LA Dimen Index (2D)             1.86 cm/m2                LA Volume (4C A-L)                36.16 ml                LA Volume (BP A-L)                42.54 ml                LA Volume Index (BP A-L)       20.30 ml/m2   16.00-34.00  LA Volume Index (BP MOD)       19.05 ml/m2   16.00-34.00   RA Dimensions ---------------------------------------------------------------------- RA Area (4C)                     10.21 cm2       <=18.00  RA ESV Index (4C MOD)           9.87 ml/m2       <=32.00   Report Signatures Finalized by Dr. Luis Miguel Simmons MD, [facility], [facility] on 10/30/2023 03:45 PM  ~

## 2024-01-04 NOTE — ASSESSMENT
[FreeTextEntry1] : 58 M  Paroxysmal Atrial fibrillation dx Oct 2023 R MCA Stroke - (May 2021) - Biotronik ILR PFO - s/p closure device 25mm Amplatz occluder December 2021 at [facility] Dr. Cardoza HTN - controlled  EKG sinus rate 61 LAD unchanged possible inferior infarct. Coronary CTA Oct 2023 - significant plaque burden, no obstructive disease, moderate mid LAD stenosis. Chest CTA Oct 2023 ruled out for aortic dissection, lungs clear ECHO Oct 2023 - unremarkable Echo (jan 2022) - EF 62% - normal LV size, function. mild LVH 1.2 cm. Mildly elevated filling pressures. No evidence of PFO KAUR (nov 2021) - EF 60% - normal LV size function and wall thickness. LA appendage is normal. LA is normal in size. Evidence of pfo based on a positive saline study. PFO Closure report (dec 2021) - successful PFO closure Bilateral LE venous US (aug 2021) - no evidence of DVT - sluggish flow throughout Bilateral carotid US (march 2020) - no significant obstructive lesions noted in the extracranial carotid system. TST (march 2020) - Normal ST segment response to stress. HTN response (230/93). CTA May 2021 severe stenosis or occlusion of the inferior division of the anterior segment of the right middle cerebral artery. normal CT of extracranial circulation. No evidence of carotid stenosis  - continue eliquis 5mg bid for stroke prevention - continue aspirin 81mg and lipitor 40mg given moderate LAD stenosis noted on CCTA. Stress echo with normal wall motion post stress, global augmentation, + ST depressions, good exercise tolerance 10.5 METS.  He has no symptoms concerning for symptomatic obstructive CAD.  Continue medical management.   - will continue to monitor ILR for afib burden, in sinus today.   - continue lopressor 12.5mg bid, ramipril stopped after last visit.   BPs normalized.    Advised that if palps recur ok to take additional dose of lopressor.

## 2024-01-04 NOTE — REASON FOR VISIT
[Follow-Up Visit] : a follow-up visit for [Symptom and Test Evaluation] : symptom and test evaluation [FreeTextEntry2] : stroke, HTN, PFO, seizures

## 2024-01-24 ENCOUNTER — APPOINTMENT (OUTPATIENT)
Dept: NEUROLOGY | Facility: CLINIC | Age: 59
End: 2024-01-24
Payer: COMMERCIAL

## 2024-01-24 VITALS
HEART RATE: 58 BPM | BODY MASS INDEX: 28.14 KG/M2 | WEIGHT: 190 LBS | DIASTOLIC BLOOD PRESSURE: 78 MMHG | HEIGHT: 69 IN | OXYGEN SATURATION: 98 % | SYSTOLIC BLOOD PRESSURE: 124 MMHG

## 2024-01-24 DIAGNOSIS — I10 ESSENTIAL (PRIMARY) HYPERTENSION: ICD-10-CM

## 2024-01-24 DIAGNOSIS — H81.399 OTHER PERIPHERAL VERTIGO, UNSPECIFIED EAR: ICD-10-CM

## 2024-01-24 DIAGNOSIS — M47.812 SPONDYLOSIS W/OUT MYELOPATHY OR RADICULOPATHY, CERVICAL REGION: ICD-10-CM

## 2024-01-24 DIAGNOSIS — Z86.73 PERSONAL HISTORY OF TRANSIENT ISCHEMIC ATTACK (TIA), AND CEREBRAL INFARCTION W/OUT RESIDUAL DEFICITS: ICD-10-CM

## 2024-01-24 DIAGNOSIS — I25.10 ATHEROSCLEROTIC HEART DISEASE OF NATIVE CORONARY ARTERY W/OUT ANGINA PECTORIS: ICD-10-CM

## 2024-01-24 DIAGNOSIS — I63.9 CEREBRAL INFARCTION, UNSPECIFIED: ICD-10-CM

## 2024-01-24 DIAGNOSIS — I48.0 PAROXYSMAL ATRIAL FIBRILLATION: ICD-10-CM

## 2024-01-24 PROCEDURE — G2211 COMPLEX E/M VISIT ADD ON: CPT

## 2024-01-24 PROCEDURE — 99215 OFFICE O/P EST HI 40 MIN: CPT

## 2024-01-24 RX ORDER — DIVALPROEX SODIUM 500 MG/1
500 TABLET, DELAYED RELEASE ORAL
Qty: 180 | Refills: 3 | Status: ACTIVE | COMMUNITY
Start: 2021-11-29 | End: 1900-01-01

## 2024-01-24 RX ORDER — AMOXICILLIN 500 MG/1
500 TABLET, FILM COATED ORAL
Qty: 4 | Refills: 4 | Status: DISCONTINUED | COMMUNITY
Start: 2022-10-03 | End: 2024-01-24

## 2024-01-24 RX ORDER — SILDENAFIL 50 MG/1
50 TABLET ORAL
Qty: 6 | Refills: 1 | Status: DISCONTINUED | COMMUNITY
Start: 2021-10-26 | End: 2024-01-24

## 2024-01-24 RX ORDER — LORAZEPAM 0.5 MG/1
0.5 TABLET ORAL
Qty: 4 | Refills: 0 | Status: DISCONTINUED | COMMUNITY
Start: 2022-08-26 | End: 2024-01-24

## 2024-01-24 RX ORDER — GABAPENTIN 300 MG/1
300 CAPSULE ORAL DAILY
Qty: 90 | Refills: 2 | Status: ACTIVE | COMMUNITY
Start: 2023-01-18 | End: 1900-01-01

## 2024-01-24 RX ORDER — ATORVASTATIN CALCIUM 40 MG/1
40 TABLET, FILM COATED ORAL DAILY
Qty: 90 | Refills: 3 | Status: ACTIVE | COMMUNITY
Start: 1900-01-01 | End: 1900-01-01

## 2024-01-24 RX ORDER — APIXABAN 5 MG/1
5 TABLET, FILM COATED ORAL
Qty: 180 | Refills: 3 | Status: ACTIVE | COMMUNITY
Start: 2023-11-09 | End: 1900-01-01

## 2024-01-24 NOTE — DATA REVIEWED
[de-identified] : MRI brain showed acute right mca ischemic infarct\par  CTA head showed abrupt cutoff of right M3 but no proximal artery stenosis

## 2024-01-24 NOTE — ASSESSMENT
[FreeTextEntry1] :     58 year old with sustained cryptogenic embolic right mca stroke with residual impairment in executive cognitive functioning and emotional lability.  He had a focal seizure for which he is on Depakote which also assists in mood lability post-stroke.  New symptoms of position related vertigo with left rotatary nystagmus consistent with BPPV    1) stroke prevention- s/p PFO closure and now after nearly 2 yr of ILR placement Afib detected and now on ELiquis. LDL < 70 mg dl, continue statin but will obtain Lipid panel , BP well controlled    2) Seizure prevention - continue Depakote 1000 mg/1500 mg alternating regimen but will check Depakote level  3) Refer for Vestibular therapy for BPPV     f/u in 12 months

## 2024-01-24 NOTE — PHYSICAL EXAM
[FreeTextEntry1] : Mental status: Orientation: Alert , oriented to month, day of week, year, location                                                                           Speech is fluent , able to name objects, repeat a sentence and write a sentence                                                                                                                                                                                        Cranial nerves: CN I deferred. VFF ; f III, IV, VI: PERRLA, EOM with left gaze evoked rotator nystagmus IV: Facial sensation normal B/L to touch, pinprick and temperature VII:Facial strength mild left NLF VIII: Gross hearing intact IX, X: palate midline and elevates symmetrically XI: Trapezius normal strength, XII: Tongue midline without atrophy or fasciculation Motor: Muscle tone no rigidity or resistance in all 4 extremities. No atrophy or fasciculation. Muscle strength: arms and legs, proximal and distal flexors and extensors are normal 5/5 . No UE drift. Sensory:tactile sensation on left hand illicit a cold sensation  Coordination: finger to nose: normal. Heel to shin: normal Gait: steady normal based ; Romberg test negative

## 2024-01-24 NOTE — HISTORY OF PRESENT ILLNESS
[FreeTextEntry1] : 56 year old male with history of embolic right MCA stroke with residual cognitive deficits primarily affecting his speech of processing , multitasking, and executive dysfunction as he has persistent left hemineglect. Initially he was noted to be very angry and have impulse control issues post stroke which has improved with the addition of Depakote. He had a focal motor seizure described as involuntary clonic jerking of his hand several months post stroke as well.   He remains on Depakote with no complaints He had an episode of rapid afib in Oct 2023-- he was symptomatic and went to the ER . No prior recordings of Afib with ILR. He has since been started on Eliquis . He was also found to have nonobstrctive CAD so requested to continue aspirin as well.   He reports transient episodes of dizziness. He feels it most when he first gets out of bed . He feels unsteady on his feet when he gets up to go to the bathroom at night. He was leaning toward right side when she noted a sudden room spinning sensation the other day.

## 2024-02-08 LAB
ALBUMIN SERPL ELPH-MCNC: 3.9 G/DL
ALP BLD-CCNC: 46 U/L
ALT SERPL-CCNC: 30 U/L
ANION GAP SERPL CALC-SCNC: 12 MMOL/L
AST SERPL-CCNC: 32 U/L
BILIRUB SERPL-MCNC: 0.4 MG/DL
BUN SERPL-MCNC: 15 MG/DL
CALCIUM SERPL-MCNC: 9.1 MG/DL
CHLORIDE SERPL-SCNC: 105 MMOL/L
CHOLEST SERPL-MCNC: 140 MG/DL
CO2 SERPL-SCNC: 24 MMOL/L
CREAT SERPL-MCNC: 0.9 MG/DL
EGFR: 97 ML/MIN/1.73M2
GLUCOSE SERPL-MCNC: 97 MG/DL
HCT VFR BLD CALC: 45.5 %
HDLC SERPL-MCNC: 34 MG/DL
HGB BLD-MCNC: 14.9 G/DL
LDLC SERPL CALC-MCNC: 81 MG/DL
MCHC RBC-ENTMCNC: 31.4 PG
MCHC RBC-ENTMCNC: 32.7 GM/DL
MCV RBC AUTO: 95.8 FL
NONHDLC SERPL-MCNC: 106 MG/DL
PLATELET # BLD AUTO: 140 K/UL
POTASSIUM SERPL-SCNC: 4.4 MMOL/L
PROT SERPL-MCNC: 6.5 G/DL
RBC # BLD: 4.75 M/UL
RBC # FLD: 13.9 %
SODIUM SERPL-SCNC: 141 MMOL/L
TRIGL SERPL-MCNC: 143 MG/DL
VALPROATE SERPL-MCNC: 74 UG/ML
WBC # FLD AUTO: 7.04 K/UL

## 2024-02-29 ENCOUNTER — APPOINTMENT (OUTPATIENT)
Dept: HEMATOLOGY ONCOLOGY | Facility: CLINIC | Age: 59
End: 2024-02-29
Payer: COMMERCIAL

## 2024-02-29 VITALS
HEIGHT: 69 IN | BODY MASS INDEX: 29.65 KG/M2 | HEART RATE: 60 BPM | RESPIRATION RATE: 16 BRPM | TEMPERATURE: 97.5 F | SYSTOLIC BLOOD PRESSURE: 122 MMHG | OXYGEN SATURATION: 97 % | WEIGHT: 200.19 LBS | DIASTOLIC BLOOD PRESSURE: 73 MMHG

## 2024-02-29 DIAGNOSIS — D69.6 THROMBOCYTOPENIA, UNSPECIFIED: ICD-10-CM

## 2024-02-29 PROCEDURE — 99214 OFFICE O/P EST MOD 30 MIN: CPT

## 2024-02-29 PROCEDURE — 36415 COLL VENOUS BLD VENIPUNCTURE: CPT

## 2024-02-29 PROCEDURE — G2211 COMPLEX E/M VISIT ADD ON: CPT

## 2024-02-29 NOTE — REVIEW OF SYSTEMS
[Palpitations] : palpitations [Negative] : Heme/Lymph [FreeTextEntry5] : loop recorder: once in a while  [de-identified] : left side neglect due to stroke

## 2024-02-29 NOTE — HISTORY OF PRESENT ILLNESS
[Home] : at home, [unfilled] , at the time of the visit. [Medical Office: ([facility])___] : at the medical office located in  [Spouse] : spouse [Verbal consent obtained from patient] : the patient, [unfilled] [de-identified] : Mr. Josemanuel Minaya is 56 year old male with h/o HTN, HLD, cervical/lumbar herniated discs with recent right MCA infarction here for further evaluation. \par  \par  Patient had COVID in 2020 but the cardiologist R/O symptoms of COVID residues. \par  On 2021, he flew to [place] to visit family and came back on the . On the  he was admitted to the hospital for a stroke.  \par  Patient presented to the [facility] on 21 after being found on the floor by his wife and noted to have altered mental status.  He said he fell and tried to get up several times; he hit head and right side of chest, but no LOC.  \par  \par  Work up with 21 CT of the head was done which noted acute infarction in the right MCA distribution, left external capsule lacunar infarct, age-indeterminate.  CTA of the head and neck noted severe stenosis or occlusion of the inferior division of the M3 segment of the right middle cerebral artery.  Patient was evaluated by Dr. Mosquera of Neurology who recommended to continue on aspirin, high-dose Lipitor and MRI of brain (21) noted acute, large, right MCA distribution infarct, predominantly cortical base with developing mass-effect with 6 mm right to left midline shift and petechial blood product. \par  \par  Echocardiogram noted an ejection fraction of 66%, grade 1/4 diastolic dysfunction and PFO was not evident. Lower ext Doppler no DVT noted however patient was sluggish flow.\par  On examination patient noted to have mild left facial weakness with left hemineglect which remained stable throughout the hospital course.  Rest of hospital course was otherwise uncomplicated.  Patient was then discharged to  [facility] on 5/10/21 with improved left side weakness and speech. \par  \par  \par  FHx: \par  SHx: knee surgeries\par           ACL/LCL\par           Varicose: Burst blood vessel in the scrotal area. \par  \par  Family hx:\par  Aunt: TIA \par  Maternal Grandmother: 2x strokes, . \par  Mother: TIA\par  Father: cardiac issues\par  Paternal Grandmother: aortic crystalization\par  \par  Social Hx:\par  Smoker: N/A\par  Drinker: rarely \par  Illicit: N/A\par   [de-identified] : Patient is seen today for follow up and be evaluated for thrombocytopenia Accompanied by his wife  Overall feels well Continues on Eliquis 5mg BID.  Denies any abnormal bleeding bruising Followed by Dr Bolanos - consistently having low PLT count Last Count 2/7/24 - 10

## 2024-02-29 NOTE — ASSESSMENT
[FreeTextEntry1] : Thrombocytopenia Plt count:  6/2022- 138 2/2024- 140k Denies any bleeding Discussed at length about the differential diagnosis such as viral infection (HIV, Hepatitis B, Hepatitis C), nutritional (B12, folate deficiency), thyroid disorders, liver disease, alcohol, hypersplenism, drug induced, connective tissue disorders, ITP His overall CBC is suggesting more of ITP kind of a picture. Other possibility is fatty liver for which we can do a ultrasound abdomen Overall his platelets are only borderline low, and we can monitor until he is symptomatic or the platelets decline further He and his wife are leaning more towards monitoring for now.  Right MCA Stroke Unprovoked CT angiogram- Severe stenosis or occlusion of the inferior division of the M3 segment of the rt MCA. Echocardiogram with bubble- No PFO Has a loop recorder placed by Dr Owen BETANCUR 8/2020 - fever, cough, loss of taste/smell COVID AB x 2 very high during the admission. PCR Negative Has not had the COVID vaccine as yet HO multiple concussions - played soccer Non smoker Extensive family ho CAD and CVA but he is the youngest to get stroke Discussed about various thrombophilias work up finding Essentially negative except for slightly low AT3 which was improving upon repeat His wife declines that he got any heparin or lovenox while he was hospitalized with stroke Currently on Eliquis  Cancer screening Advised to be up-to-date with age-appropriate cancer screening  Patient and his wife had multiple questions which were answered to satisfaction  They prefer to monitor his CBC every 6 to 12 months with Dr. Harman They will follow-up with me as needed

## 2024-02-29 NOTE — RESULTS/DATA
[FreeTextEntry1] : Labs reviewed, analyzed and discussed Factor V Leiden mutation, Prothrombin Gene mutation, APS panel, Protein S - negative Antithrombin 3- 79% (normal %)

## 2024-03-08 ENCOUNTER — APPOINTMENT (OUTPATIENT)
Dept: HEART AND VASCULAR | Facility: CLINIC | Age: 59
End: 2024-03-08

## 2024-03-13 ENCOUNTER — RX RENEWAL (OUTPATIENT)
Age: 59
End: 2024-03-13

## 2024-03-13 RX ORDER — DIVALPROEX SODIUM 500 MG/1
500 TABLET, DELAYED RELEASE ORAL
Qty: 225 | Refills: 2 | Status: ACTIVE | COMMUNITY
Start: 2023-01-24 | End: 1900-01-01

## 2024-07-19 ENCOUNTER — APPOINTMENT (OUTPATIENT)
Dept: HEART AND VASCULAR | Facility: CLINIC | Age: 59
End: 2024-07-19
Payer: COMMERCIAL

## 2024-07-19 VITALS
HEIGHT: 69 IN | WEIGHT: 198 LBS | BODY MASS INDEX: 29.33 KG/M2 | DIASTOLIC BLOOD PRESSURE: 64 MMHG | SYSTOLIC BLOOD PRESSURE: 122 MMHG

## 2024-07-19 DIAGNOSIS — I25.10 ATHEROSCLEROTIC HEART DISEASE OF NATIVE CORONARY ARTERY W/OUT ANGINA PECTORIS: ICD-10-CM

## 2024-07-19 DIAGNOSIS — I10 ESSENTIAL (PRIMARY) HYPERTENSION: ICD-10-CM

## 2024-07-19 DIAGNOSIS — Q21.12 PATENT FORAMEN OVALE: ICD-10-CM

## 2024-07-19 DIAGNOSIS — Z87.74 PERSONAL HISTORY OF (CORRECTED) CONGENITAL MALFORMATIONS OF HEART AND CIRCULATORY SYSTEM: ICD-10-CM

## 2024-07-19 DIAGNOSIS — Z86.73 PERSONAL HISTORY OF TRANSIENT ISCHEMIC ATTACK (TIA), AND CEREBRAL INFARCTION W/OUT RESIDUAL DEFICITS: ICD-10-CM

## 2024-07-19 DIAGNOSIS — I48.0 PAROXYSMAL ATRIAL FIBRILLATION: ICD-10-CM

## 2024-07-19 DIAGNOSIS — R53.81 OTHER MALAISE: ICD-10-CM

## 2024-07-19 PROCEDURE — 93000 ELECTROCARDIOGRAM COMPLETE: CPT

## 2024-07-19 PROCEDURE — 99214 OFFICE O/P EST MOD 30 MIN: CPT

## 2024-07-22 ENCOUNTER — NON-APPOINTMENT (OUTPATIENT)
Age: 59
End: 2024-07-22

## 2024-07-23 ENCOUNTER — APPOINTMENT (OUTPATIENT)
Dept: NEUROLOGY | Facility: CLINIC | Age: 59
End: 2024-07-23
Payer: COMMERCIAL

## 2024-07-23 VITALS
HEIGHT: 69 IN | SYSTOLIC BLOOD PRESSURE: 119 MMHG | OXYGEN SATURATION: 98 % | HEART RATE: 57 BPM | WEIGHT: 199 LBS | BODY MASS INDEX: 29.47 KG/M2 | DIASTOLIC BLOOD PRESSURE: 79 MMHG

## 2024-07-23 DIAGNOSIS — F01.50 VASCULAR DEMENTIA W/OUT BEHAVIORAL DISTURBANCE: ICD-10-CM

## 2024-07-23 PROCEDURE — G2211 COMPLEX E/M VISIT ADD ON: CPT

## 2024-07-23 PROCEDURE — 99215 OFFICE O/P EST HI 40 MIN: CPT

## 2024-07-23 RX ORDER — HYDROCHLOROTHIAZIDE 25 MG/1
25 TABLET ORAL
Refills: 0 | Status: ACTIVE | COMMUNITY

## 2024-07-23 NOTE — ASSESSMENT
[FreeTextEntry1] :     58 year old with sustained cryptogenic embolic right mca stroke with residual impairment in executive cognitive functioning and emotional lability.  He had a focal seizure for which he is on Depakote which also assists in mood lability post-stroke.     1) stroke prevention- s/p PFO closure and now after nearly 2 yr of ILR placement Afib detected and now on ELiquis. LDL goal  < 70 mg /dl is the goal so will recheck lipid panel as he has boosted up exercise since last checked in Feb 2024.     2) Seizure prevention - continue Depakote 1000 mg/1500 mg alternating regimen but will check Depakote level  3) persistent behavior and cognitive deficits due to right MCA stroke unlikely to improve at this point     f/u in 6 months

## 2024-07-23 NOTE — DATA REVIEWED
[de-identified] : MRI brain showed acute right mca ischemic infarct\par  CTA head showed abrupt cutoff of right M3 but no proximal artery stenosis

## 2024-07-23 NOTE — HISTORY OF PRESENT ILLNESS
[FreeTextEntry1] : 56 year old male with history of embolic right MCA stroke with residual cognitive deficits primarily affecting his speech of processing , multitasking, and executive dysfunction as he has persistent left hemineglect. Initially he was noted to be very angry and have impulse control issues post stroke which has improved with the addition of Depakote. He had a focal motor seizure described as involuntary clonic jerking of his hand several months post stroke as well.   He remains on Depakote with no complaints He had an episode of rapid afib in Oct 2023-- he was symptomatic and went to the ER . No prior recordings of Afib with ILR. He has since been started on Eliquis . He was also found to have nonobstrctive CAD so requested to continue aspirin as well.   He reports transient episodes of dizziness. He feels it most when he first gets out of bed . He feels unsteady on his feet when he gets up to go to the bathroom at night. He has since undergone vestibular therapy and using a pillow which prevents reoccurrence of peripheral vertigo   He is still undergoing PT for restricted right shoulder.  He has has persistent processing issues and cannot multitask which prevents him from returning to same level of cognitive functioning prior to the stroke

## 2024-07-31 LAB
ALBUMIN SERPL ELPH-MCNC: 3.9 G/DL
ALP BLD-CCNC: 51 U/L
ALT SERPL-CCNC: 44 U/L
ANION GAP SERPL CALC-SCNC: 14 MMOL/L
AST SERPL-CCNC: 44 U/L
BILIRUB SERPL-MCNC: 0.4 MG/DL
BUN SERPL-MCNC: 10 MG/DL
CALCIUM SERPL-MCNC: 9.3 MG/DL
CHLORIDE SERPL-SCNC: 101 MMOL/L
CHOLEST SERPL-MCNC: 137 MG/DL
CO2 SERPL-SCNC: 23 MMOL/L
CREAT SERPL-MCNC: 0.78 MG/DL
EGFR: 101 ML/MIN/1.73M2
GLUCOSE SERPL-MCNC: 106 MG/DL
HDLC SERPL-MCNC: 32 MG/DL
LDLC SERPL CALC-MCNC: 77 MG/DL
NONHDLC SERPL-MCNC: 105 MG/DL
POTASSIUM SERPL-SCNC: 3.9 MMOL/L
PROT SERPL-MCNC: 6.7 G/DL
SODIUM SERPL-SCNC: 138 MMOL/L
TRIGL SERPL-MCNC: 164 MG/DL

## 2024-08-01 LAB — VALPROATE SERPL-MCNC: 106 UG/ML

## 2024-08-17 ENCOUNTER — NON-APPOINTMENT (OUTPATIENT)
Age: 59
End: 2024-08-17

## 2024-08-17 NOTE — HISTORY OF PRESENT ILLNESS
[de-identified] : This HPI reflects a summary and review of records : including previous and most recent  Labs, body imaging, consults and progress notes, operative and pathology reports, EKG reports, ED records, found in [facility], [facility] and/or [facility]   PCP: Jonatan  59 yo M w h/o R CVA-embolic cb focal motor sz, s/p closure PFO,  HTN, Cervical/ Lumbar Disc dis w radiculopathy, Degen Disc/ arthritis of Thoracic spine  Kidney Stones w L. Hydo 2/2 UVJ stone-1/2012 cysto w laser litho & stent Hemorrhoids  1/21/21 Init CC: Over last several months c/o L groin pain, had a psoas injection and felt better now intermittent LLQ pain: dull ache, incr w sitting, decreases w relaxing, stretching No change in bowel habits, Usu # 4, 3x/D, no blood or mucous  2/19/21 Colonoscopy 2nd deg int hemorrhoids, rectum w prep effect  On Atorvastatin, Depakote, Gabapentin--since CVA  2/27/23:  AST=58, ALT=47, ALP=55 4/17/23 : AST=32, ALT=32, ALP=48  10/16/23 :  still having Intermittnet LLQ pain, sharp, building last < 1hr                   usually BMs:# 4 qd , can have # 1 if not eating well or drinking enough fluid                    + Bloat                     No BRBPR  12/4/23 CT Abd/pelvis: mild L hydroureteronephrosis w mulitple fragmented stones in ureter 12/5/23 Cysto w L ureteroscopy, laser litho w stent  8/19/24 Today:  no c/o , SOB/ SANTOS, Cough, Wheeze, Palpitations, edema  8/19/24     Today:    * Abd pain--LLQ pain -->  * Nausea--> no * Vomit--> no * Early satiety--> no * Belching--> no * Hiccups--> no * Regurgitation--> no * Acid Taste / Water Brash--> no * Ht burn--> no * Dysphagia--> no * Throat Clearing--> no * Hoarseness--> no * Post-Nasal Drip--> no * Congestion--> no * Globus--> no * Cough--> no * Wheeze / PC-> -no * Constipation-->  * Diarrhea--> no * Bloating-->  * Strain on Defecation--> no * Incompl Evac--> no * Flatulence--> no * Gurgling--> no * Melena--> no * BPBPR-> -no * Anorexia--> no * Wt. Loss--> no

## 2024-08-17 NOTE — ADDENDUM
[FreeTextEntry1] : I spent a total of   40 minutes with this patient encounter . Greater than 50% of the time spent was     devoted to counseling and coordinating care including review of records, pertinent labs     data and studies, as well as discussing diagnosis evaluation and workup, planned     therapeutic interventions and future disposition of care. This includes any additional     research needed to obtain further information in formulating the plan of care of     this patient. This includes counseling the patient about their disease and diagnosis.

## 2024-08-17 NOTE — PHYSICAL EXAM
[General Appearance - Alert] : alert [General Appearance - In No Acute Distress] : in no acute distress [] : no respiratory distress [Auscultation Breath Sounds / Voice Sounds] : lungs were clear to auscultation bilaterally [Heart Rate And Rhythm] : heart rate was normal and rhythm regular [Heart Sounds] : normal S1 and S2 [Heart Sounds Gallop] : no gallops [Murmurs] : no murmurs [Heart Sounds Pericardial Friction Rub] : no pericardial rub [Edema] : there was no peripheral edema [Flat] : flat [Normal] : normal [Soft, Nontender] : the abdomen was soft and nontender [No Mass] : no masses were palpated [No HSM] : no hepatosplenomegaly noted [Skin Color & Pigmentation] : normal skin color and pigmentation [Oriented To Time, Place, And Person] : oriented to person, place, and time

## 2024-08-17 NOTE — ASSESSMENT
[FreeTextEntry1] :  Rectal bleeding--> intermittnet,  BRB, No Clots, on TP/Water On Eliquis/ASA Assoc w   probably Local cause Likely Hemorrhoidal  * Discussed  the  potential complications of thrombosis,  pain,  infection,  swelling, itching,  bleeding --none recently Recommendations:  * Moderate- High  Fiber Diet was  emphasized * 6  --  8 cups of decaffeinated fluid daily was emphasized  * Sitz Bathes as needed ,  No:  Anusol HC  Suppos / Cream  OH BID -- was needed * No:  Tucks BID,  Balneol Lotion,   Calmoseptine Oint -- was needed ;    can use  Prep H prn * No:  need for  Colorectal surgical evaluation for possible ablation  for colonoscopy      1. H/O  LLQ pain--> intermittnet    assoc w change in bowel habits --intermittnet  constipation/ bloat   2/19/21 Colonoscopy 2nd deg int hemorrhoids, rectum w prep effect            No evidence of  diverticulosis; IBD; Colonic neoplasia  12/4/23 CT Abd/pelvis: mild L hydroureteronephrosis w mulitple fragmented stones in ureter 12/5/23 Cysto w L ureteroscopy, laser litho w stent     P:   Recommend:  * Diet: High Fiber,  Low Fat & Lactose free, Low FODMAPs--was  emphasized * Daily fluid intake was reviewed : 6  --  8 cups of decaffeinated fluid daily was emphasized * Regular aerobic exercise was emphasized * Probiotics  1  daily      2. Colorectal   Neoplasia  Screening:     Utilizing teaching posters and anatomical models the following were discussed and emphasized with the patient in detail: * Discussed the pre-malignant potential of polyps * Discussed the importance of f/u surveillance / screening colonoscopy * moderate-High  Fiber Diet was reviewed and emphasized * Anti-oxidants and ASA/NSAID Therapy emphasized * Given age > 40-49 yo * Recommend Colonoscopy  to  R/O  Colonic Neoplasia      Informed Consent: * The risks & Benefits of  Colonoscopy were discussed w patient. * This included but was not limited to perforation, bleeding, sedation /med rxns, missed lesions possibly requiring surgery, blood transfusions, antibiotics & CPR/Intubation. * Pt. understands & agrees to the procedures. The following instructions in regards to the prep and medically essential ( cardiac, pulmonary, sz, psych, endocrine)  pre-op medication administration was reviewed and emphasized with the patient .  * Pt. advised to D/C NSAIDs  7  Days & Eliquis 4   PTP. * [ +++ ]  Dulcolax / Miralax / Mag. Citrate ,  [     ] Prepopik/ Clenpiq ,  [     ] Osmo Prep,  [    ] GoLytely,  prep. reviewed w Pt. * Hold  [           ] AM of procedure. * Hold  [           ] PM  before procedure. * Take  [           ] PM  before procedure. * Take  [  amox, depakote & ramipril          ] AM of procedure.

## 2024-08-17 NOTE — HISTORY OF PRESENT ILLNESS
[de-identified] : This HPI reflects a summary and review of records : including previous and most recent  Labs, body imaging, consults and progress notes, operative and pathology reports, EKG reports, ED records, found in [facility], [facility] and/or [employer]   PCP: Jonatan  57 yo M w h/o R CVA-embolic cb focal motor sz, s/p closure PFO,  HTN, Cervical/ Lumbar Disc dis w radiculopathy, Degen Disc/ arthritis of Thoracic spine  Kidney Stones w L. Hydo 2/2 UVJ stone-1/2012 cysto w laser litho & stent Hemorrhoids  1/21/21 Init CC: Over last several months c/o L groin pain, had a psoas injection and felt better now intermittent LLQ pain: dull ache, incr w sitting, decreases w relaxing, stretching No change in bowel habits, Usu # 4, 3x/D, no blood or mucous  2/19/21 Colonoscopy 2nd deg int hemorrhoids, rectum w prep effect  On Atorvastatin, Depakote, Gabapentin--since CVA  2/27/23:  AST=58, ALT=47, ALP=55 4/17/23 : AST=32, ALT=32, ALP=48  10/16/23 :  still having Intermittnet LLQ pain, sharp, building last < 1hr                   usually BMs:# 4 qd , can have # 1 if not eating well or drinking enough fluid                    + Bloat                     No BRBPR  12/4/23 CT Abd/pelvis: mild L hydroureteronephrosis w mulitple fragmented stones in ureter 12/5/23 Cysto w L ureteroscopy, laser litho w stent  8/19/24 Today:  no c/o , SOB/ SANTOS, Cough, Wheeze, Palpitations, edema  8/19/24     Today:    * Abd pain--LLQ pain -->  * Nausea--> no * Vomit--> no * Early satiety--> no * Belching--> no * Hiccups--> no * Regurgitation--> no * Acid Taste / Water Brash--> no * Ht burn--> no * Dysphagia--> no * Throat Clearing--> no * Hoarseness--> no * Post-Nasal Drip--> no * Congestion--> no * Globus--> no * Cough--> no * Wheeze / PC-> -no * Constipation-->  * Diarrhea--> no * Bloating-->  * Strain on Defecation--> no * Incompl Evac--> no * Flatulence--> no * Gurgling--> no * Melena--> no * BPBPR-> -no * Anorexia--> no * Wt. Loss--> no

## 2024-08-17 NOTE — ASSESSMENT
[FreeTextEntry1] :  Rectal bleeding--> intermittnet,  BRB, No Clots, on TP/Water On Eliquis/ASA Assoc w   probably Local cause Likely Hemorrhoidal  * Discussed  the  potential complications of thrombosis,  pain,  infection,  swelling, itching,  bleeding --none recently Recommendations:  * Moderate- High  Fiber Diet was  emphasized * 6  --  8 cups of decaffeinated fluid daily was emphasized  * Sitz Bathes as needed ,  No:  Anusol HC  Suppos / Cream  CA BID -- was needed * No:  Tucks BID,  Balneol Lotion,   Calmoseptine Oint -- was needed ;    can use  Prep H prn * No:  need for  Colorectal surgical evaluation for possible ablation  for colonoscopy      1. H/O  LLQ pain--> intermittnet    assoc w change in bowel habits --intermittnet  constipation/ bloat   2/19/21 Colonoscopy 2nd deg int hemorrhoids, rectum w prep effect            No evidence of  diverticulosis; IBD; Colonic neoplasia  12/4/23 CT Abd/pelvis: mild L hydroureteronephrosis w mulitple fragmented stones in ureter 12/5/23 Cysto w L ureteroscopy, laser litho w stent     P:   Recommend:  * Diet: High Fiber,  Low Fat & Lactose free, Low FODMAPs--was  emphasized * Daily fluid intake was reviewed : 6  --  8 cups of decaffeinated fluid daily was emphasized * Regular aerobic exercise was emphasized * Probiotics  1  daily      2. Colorectal   Neoplasia  Screening:     Utilizing teaching posters and anatomical models the following were discussed and emphasized with the patient in detail: * Discussed the pre-malignant potential of polyps * Discussed the importance of f/u surveillance / screening colonoscopy * moderate-High  Fiber Diet was reviewed and emphasized * Anti-oxidants and ASA/NSAID Therapy emphasized * Given age > 40-49 yo * Recommend Colonoscopy  to  R/O  Colonic Neoplasia      Informed Consent: * The risks & Benefits of  Colonoscopy were discussed w patient. * This included but was not limited to perforation, bleeding, sedation /med rxns, missed lesions possibly requiring surgery, blood transfusions, antibiotics & CPR/Intubation. * Pt. understands & agrees to the procedures. The following instructions in regards to the prep and medically essential ( cardiac, pulmonary, sz, psych, endocrine)  pre-op medication administration was reviewed and emphasized with the patient .  * Pt. advised to D/C NSAIDs  7  Days & Eliquis 4   PTP. * [ +++ ]  Dulcolax / Miralax / Mag. Citrate ,  [     ] Prepopik/ Clenpiq ,  [     ] Osmo Prep,  [    ] GoLytely,  prep. reviewed w Pt. * Hold  [           ] AM of procedure. * Hold  [           ] PM  before procedure. * Take  [           ] PM  before procedure. * Take  [  amox, depakote & ramipril          ] AM of procedure.

## 2024-08-26 ENCOUNTER — RESULT REVIEW (OUTPATIENT)
Age: 59
End: 2024-08-26

## 2024-09-03 ENCOUNTER — APPOINTMENT (OUTPATIENT)
Dept: GASTROENTEROLOGY | Facility: CLINIC | Age: 59
End: 2024-09-03
Payer: COMMERCIAL

## 2024-09-03 VITALS
WEIGHT: 198 LBS | SYSTOLIC BLOOD PRESSURE: 108 MMHG | DIASTOLIC BLOOD PRESSURE: 66 MMHG | BODY MASS INDEX: 29.33 KG/M2 | HEIGHT: 69 IN | HEART RATE: 57 BPM

## 2024-09-03 DIAGNOSIS — K64.8 OTHER HEMORRHOIDS: ICD-10-CM

## 2024-09-03 DIAGNOSIS — Z12.11 ENCOUNTER FOR SCREENING FOR MALIGNANT NEOPLASM OF COLON: ICD-10-CM

## 2024-09-03 DIAGNOSIS — R19.4 CHANGE IN BOWEL HABIT: ICD-10-CM

## 2024-09-03 DIAGNOSIS — K62.5 HEMORRHAGE OF ANUS AND RECTUM: ICD-10-CM

## 2024-09-03 PROCEDURE — 99214 OFFICE O/P EST MOD 30 MIN: CPT

## 2024-09-03 NOTE — ASSESSMENT
[FreeTextEntry1] : Rectal bleeding--> intermittnet,  BRB, No Clots, on TP/Water On Eliquis/ASA Assoc w constipation, straining   probably Local cause Likely Hemorrhoidal  * Discussed  the  potential complications of thrombosis,  pain,  infection,  swelling, itching,  bleeding --none recently Recommendations:  * Moderate- High  Fiber Diet was  emphasized * 6  --  8 cups of decaffeinated fluid daily was emphasized  * Sitz Bathes as needed ,  No:  Anusol HC  Suppos / Cream  NV BID -- was needed * No:  Tucks BID,  Balneol Lotion,   Calmoseptine Oint -- was needed ;    can use  Prep H prn * No:  need for  Colorectal surgical evaluation for possible ablation  for colonoscopy      1. H/O  LLQ pain--> intermittnet--> none recently     assoc w change in bowel habits --intermittnet  constipation/ bloat --> improved   2/19/21 Colonoscopy 2nd deg int hemorrhoids, rectum w prep effect            No evidence of  diverticulosis; IBD; Colonic neoplasia  12/4/23 CT Abd/pelvis: mild L hydroureteronephrosis w mulitple fragmented stones in ureter 12/5/23 Cysto w L ureteroscopy, laser litho w stent     P:   Recommend:  * Diet: High Fiber,  Low Fat & Lactose free, Low FODMAPs--was  emphasized * Daily fluid intake was reviewed : 6  --  8 cups of decaffeinated fluid daily was emphasized * Regular aerobic exercise was emphasized * Probiotics  1  daily      2. Colorectal   Neoplasia  Screening:     Utilizing teaching posters and anatomical models the following were discussed and emphasized with the patient in detail: * Discussed the pre-malignant potential of polyps * Discussed the importance of f/u surveillance / screening colonoscopy * moderate-High  Fiber Diet was reviewed and emphasized * Anti-oxidants and ASA/NSAID Therapy emphasized * Given age > 40-51 yo * Recommend Colonoscopy  to  R/O  Colonic Neoplasia      Informed Consent: * The risks & Benefits of  Colonoscopy were discussed w patient. * This included but was not limited to perforation, bleeding, sedation /med rxns, missed lesions possibly requiring surgery, blood transfusions, antibiotics & CPR/Intubation. * Pt. understands & agrees to the procedures. The following instructions in regards to the prep and medically essential ( cardiac, pulmonary, sz, psych, endocrine)  pre-op medication administration was reviewed and emphasized with the patient .  * Pt. advised to D/C NSAIDs  7  Days & Eliquis 4   PTP. * [ +++ ]  Dulcolax / Miralax / Mag. Citrate ,  [     ] Prepopik/ Clenpiq ,  [     ] Osmo Prep,  [    ] GoLytely,  prep. reviewed w Pt. * Hold  [           ] AM of procedure. * Hold  [           ] PM  before procedure. * Take  [           ] PM  before procedure. * Take  [  amox, depakote & ramipril          ] AM of procedure.

## 2024-09-03 NOTE — HISTORY OF PRESENT ILLNESS
[de-identified] : This HPI reflects a summary and review of records : including previous and most recent  Labs, body imaging, consults and progress notes, operative and pathology reports, EKG reports, ED records, found in [facility], [facility] and/or [employer]   PCP: Jonatan  59 yo M w h/o R CVA-embolic cb focal motor sz, s/p closure PFO,  HTN, Cervical/ Lumbar Disc dis w radiculopathy, Degen Disc/ arthritis of Thoracic spine  Kidney Stones w L. Hydo 2/2 UVJ stone-1/2012 cysto w laser litho & stent Hemorrhoids  1/21/21 Init CC: Over last several months c/o L groin pain, had a psoas injection and felt better now intermittent LLQ pain: dull ache, incr w sitting, decreases w relaxing, stretching No change in bowel habits, Usu # 4, 3x/D, no blood or mucous  2/19/21 Colonoscopy 2nd deg int hemorrhoids, rectum w prep effect  On Atorvastatin, Depakote, Gabapentin--since CVA  2/27/23:  AST=58, ALT=47, ALP=55 4/17/23 : AST=32, ALT=32, ALP=48  10/16/23 :  still having Intermittnet LLQ pain, sharp, building last < 1hr                   usually BMs:# 4 qd , can have # 1 if not eating well or drinking enough fluid                    + Bloat                     No BRBPR  12/4/23 CT Abd/pelvis: mild L hydroureteronephrosis w mulitple fragmented stones in ureter 12/5/23 Cysto w L ureteroscopy, laser litho w stent  9/3/24  Today:  no c/o , SOB/ SANTOS, Cough, Wheeze, Palpitations, edema  9/3/24     Today:  during 2nd week of august 2024 noticed bleeding                 intermittnet for 2-3 days, BRB, No Clots, on TP/Water; on On Eliquis/ASA                 No abd pian, had  constipation w  straining, started a stool softner                 eating more fiber and drinking more water                  Since then BMs: # 4qd, no further bleeding    * Abd pain--LLQ pain --> No  * Nausea--> no * Vomit--> no * Early satiety--> no * Belching--> no * Hiccups--> no * Regurgitation--> no * Acid Taste / Water Brash--> no * Ht burn--> no * Dysphagia--> no * Throat Clearing--> no * Hoarseness--> no * Post-Nasal Drip--> no * Congestion--> no * Globus--> no * Cough--> no * Wheeze / PC-> -no * Constipation--> resolved  * Diarrhea--> no * Bloating--> No  * Strain on Defecation--> no * Incompl Evac--> no * Flatulence--> no * Gurgling--> no * Melena--> no * BPBPR-> -resolved  * Anorexia--> no * Wt. Loss--> no

## 2024-09-03 NOTE — ASSESSMENT
[FreeTextEntry1] : Rectal bleeding--> intermittnet,  BRB, No Clots, on TP/Water On Eliquis/ASA Assoc w constipation, straining   probably Local cause Likely Hemorrhoidal  * Discussed  the  potential complications of thrombosis,  pain,  infection,  swelling, itching,  bleeding --none recently Recommendations:  * Moderate- High  Fiber Diet was  emphasized * 6  --  8 cups of decaffeinated fluid daily was emphasized  * Sitz Bathes as needed ,  No:  Anusol HC  Suppos / Cream  [place] BID -- was needed * No:  Tucks BID,  Balneol Lotion,   Calmoseptine Oint -- was needed ;    can use  Prep H prn * No:  need for  Colorectal surgical evaluation for possible ablation  for colonoscopy      1. H/O  LLQ pain--> intermittnet--> none recently     assoc w change in bowel habits --intermittnet  constipation/ bloat --> improved   2/19/21 Colonoscopy 2nd deg int hemorrhoids, rectum w prep effect            No evidence of  diverticulosis; IBD; Colonic neoplasia  12/4/23 CT Abd/pelvis: mild L hydroureteronephrosis w mulitple fragmented stones in ureter 12/5/23 Cysto w L ureteroscopy, laser litho w stent     P:   Recommend:  * Diet: High Fiber,  Low Fat & Lactose free, Low FODMAPs--was  emphasized * Daily fluid intake was reviewed : 6  --  8 cups of decaffeinated fluid daily was emphasized * Regular aerobic exercise was emphasized * Probiotics  1  daily      2. Colorectal   Neoplasia  Screening:     Utilizing teaching posters and anatomical models the following were discussed and emphasized with the patient in detail: * Discussed the pre-malignant potential of polyps * Discussed the importance of f/u surveillance / screening colonoscopy * moderate-High  Fiber Diet was reviewed and emphasized * Anti-oxidants and ASA/NSAID Therapy emphasized * Given age > 40-51 yo * Recommend Colonoscopy  to  R/O  Colonic Neoplasia      Informed Consent: * The risks & Benefits of  Colonoscopy were discussed w patient. * This included but was not limited to perforation, bleeding, sedation /med rxns, missed lesions possibly requiring surgery, blood transfusions, antibiotics & CPR/Intubation. * Pt. understands & agrees to the procedures. The following instructions in regards to the prep and medically essential ( cardiac, pulmonary, sz, psych, endocrine)  pre-op medication administration was reviewed and emphasized with the patient .  * Pt. advised to D/C NSAIDs  7  Days & Eliquis 4   PTP. * [ +++ ]  Dulcolax / Miralax / Mag. Citrate ,  [     ] Prepopik/ Clenpiq ,  [     ] Osmo Prep,  [    ] GoLytely,  prep. reviewed w Pt. * Hold  [           ] AM of procedure. * Hold  [           ] PM  before procedure. * Take  [           ] PM  before procedure. * Take  [  amox, depakote & ramipril          ] AM of procedure.

## 2024-09-03 NOTE — HISTORY OF PRESENT ILLNESS
[de-identified] : This HPI reflects a summary and review of records : including previous and most recent  Labs, body imaging, consults and progress notes, operative and pathology reports, EKG reports, ED records, found in [facility], [facility] and/or [facility]   PCP: Jonatan  59 yo M w h/o R CVA-embolic cb focal motor sz, s/p closure PFO,  HTN, Cervical/ Lumbar Disc dis w radiculopathy, Degen Disc/ arthritis of Thoracic spine  Kidney Stones w L. Hydo 2/2 UVJ stone-1/2012 cysto w laser litho & stent Hemorrhoids  1/21/21 Init CC: Over last several months c/o L groin pain, had a psoas injection and felt better now intermittent LLQ pain: dull ache, incr w sitting, decreases w relaxing, stretching No change in bowel habits, Usu # 4, 3x/D, no blood or mucous  2/19/21 Colonoscopy 2nd deg int hemorrhoids, rectum w prep effect  On Atorvastatin, Depakote, Gabapentin--since CVA  2/27/23:  AST=58, ALT=47, ALP=55 4/17/23 : AST=32, ALT=32, ALP=48  10/16/23 :  still having Intermittnet LLQ pain, sharp, building last < 1hr                   usually BMs:# 4 qd , can have # 1 if not eating well or drinking enough fluid                    + Bloat                     No BRBPR  12/4/23 CT Abd/pelvis: mild L hydroureteronephrosis w mulitple fragmented stones in ureter 12/5/23 Cysto w L ureteroscopy, laser litho w stent  9/3/24  Today:  no c/o , SOB/ SANTOS, Cough, Wheeze, Palpitations, edema  9/3/24     Today:  during 2nd week of august 2024 noticed bleeding                 intermittnet for 2-3 days, BRB, No Clots, on TP/Water; on On Eliquis/ASA                 No abd pian, had  constipation w  straining, started a stool softner                 eating more fiber and drinking more water                  Since then BMs: # 4qd, no further bleeding    * Abd pain--LLQ pain --> No  * Nausea--> no * Vomit--> no * Early satiety--> no * Belching--> no * Hiccups--> no * Regurgitation--> no * Acid Taste / Water Brash--> no * Ht burn--> no * Dysphagia--> no * Throat Clearing--> no * Hoarseness--> no * Post-Nasal Drip--> no * Congestion--> no * Globus--> no * Cough--> no * Wheeze / PC-> -no * Constipation--> resolved  * Diarrhea--> no * Bloating--> No  * Strain on Defecation--> no * Incompl Evac--> no * Flatulence--> no * Gurgling--> no * Melena--> no * BPBPR-> -resolved  * Anorexia--> no * Wt. Loss--> no

## 2024-09-03 NOTE — ADDENDUM
[FreeTextEntry1] : I spent a total of  30   minutes with this patient encounter . Greater than 50% of the time spent was     devoted to counseling and coordinating care including review of records, pertinent labs     data and studies, as well as discussing diagnosis evaluation and workup, planned     therapeutic interventions and future disposition of care. This includes any additional     research needed to obtain further information in formulating the plan of care of     this patient. This includes counseling the patient about their disease and diagnosis.

## 2024-10-11 ENCOUNTER — TRANSCRIPTION ENCOUNTER (OUTPATIENT)
Age: 59
End: 2024-10-11

## 2024-10-11 ENCOUNTER — RESULT REVIEW (OUTPATIENT)
Age: 59
End: 2024-10-11

## 2024-10-11 ENCOUNTER — APPOINTMENT (OUTPATIENT)
Dept: GASTROENTEROLOGY | Facility: HOSPITAL | Age: 59
End: 2024-10-11

## 2025-01-09 ENCOUNTER — APPOINTMENT (OUTPATIENT)
Dept: HEART AND VASCULAR | Facility: CLINIC | Age: 60
End: 2025-01-09
Payer: COMMERCIAL

## 2025-01-09 ENCOUNTER — NON-APPOINTMENT (OUTPATIENT)
Age: 60
End: 2025-01-09

## 2025-01-09 VITALS
SYSTOLIC BLOOD PRESSURE: 100 MMHG | HEART RATE: 68 BPM | HEIGHT: 69 IN | DIASTOLIC BLOOD PRESSURE: 60 MMHG | WEIGHT: 197.38 LBS | OXYGEN SATURATION: 98 % | BODY MASS INDEX: 29.23 KG/M2

## 2025-01-09 DIAGNOSIS — Z86.73 PERSONAL HISTORY OF TRANSIENT ISCHEMIC ATTACK (TIA), AND CEREBRAL INFARCTION W/OUT RESIDUAL DEFICITS: ICD-10-CM

## 2025-01-09 DIAGNOSIS — Z87.74 PERSONAL HISTORY OF (CORRECTED) CONGENITAL MALFORMATIONS OF HEART AND CIRCULATORY SYSTEM: ICD-10-CM

## 2025-01-09 DIAGNOSIS — I48.0 PAROXYSMAL ATRIAL FIBRILLATION: ICD-10-CM

## 2025-01-09 DIAGNOSIS — I25.10 ATHEROSCLEROTIC HEART DISEASE OF NATIVE CORONARY ARTERY W/OUT ANGINA PECTORIS: ICD-10-CM

## 2025-01-09 DIAGNOSIS — I10 ESSENTIAL (PRIMARY) HYPERTENSION: ICD-10-CM

## 2025-01-09 PROCEDURE — 93000 ELECTROCARDIOGRAM COMPLETE: CPT

## 2025-01-09 PROCEDURE — 99214 OFFICE O/P EST MOD 30 MIN: CPT

## 2025-01-09 PROCEDURE — G2211 COMPLEX E/M VISIT ADD ON: CPT

## 2025-01-31 ENCOUNTER — APPOINTMENT (OUTPATIENT)
Dept: NEUROLOGY | Facility: CLINIC | Age: 60
End: 2025-01-31
Payer: COMMERCIAL

## 2025-01-31 VITALS
HEIGHT: 69 IN | OXYGEN SATURATION: 98 % | SYSTOLIC BLOOD PRESSURE: 123 MMHG | BODY MASS INDEX: 28.44 KG/M2 | HEART RATE: 72 BPM | WEIGHT: 192 LBS | DIASTOLIC BLOOD PRESSURE: 82 MMHG

## 2025-01-31 PROCEDURE — 99215 OFFICE O/P EST HI 40 MIN: CPT

## 2025-02-04 ENCOUNTER — APPOINTMENT (OUTPATIENT)
Dept: CARDIOLOGY | Facility: CLINIC | Age: 60
End: 2025-02-04

## 2025-02-14 ENCOUNTER — APPOINTMENT (OUTPATIENT)
Dept: CARDIOLOGY | Facility: CLINIC | Age: 60
End: 2025-02-14

## 2025-02-14 ENCOUNTER — NON-APPOINTMENT (OUTPATIENT)
Age: 60
End: 2025-02-14

## 2025-02-14 ENCOUNTER — APPOINTMENT (OUTPATIENT)
Dept: HEART AND VASCULAR | Facility: CLINIC | Age: 60
End: 2025-02-14
Payer: COMMERCIAL

## 2025-02-14 VITALS
DIASTOLIC BLOOD PRESSURE: 60 MMHG | HEIGHT: 69 IN | SYSTOLIC BLOOD PRESSURE: 90 MMHG | WEIGHT: 195 LBS | HEART RATE: 51 BPM | OXYGEN SATURATION: 97 % | BODY MASS INDEX: 28.88 KG/M2

## 2025-02-14 VITALS — SYSTOLIC BLOOD PRESSURE: 104 MMHG | DIASTOLIC BLOOD PRESSURE: 60 MMHG

## 2025-02-14 DIAGNOSIS — I10 ESSENTIAL (PRIMARY) HYPERTENSION: ICD-10-CM

## 2025-02-14 DIAGNOSIS — I25.10 ATHEROSCLEROTIC HEART DISEASE OF NATIVE CORONARY ARTERY W/OUT ANGINA PECTORIS: ICD-10-CM

## 2025-02-14 DIAGNOSIS — I48.0 PAROXYSMAL ATRIAL FIBRILLATION: ICD-10-CM

## 2025-02-14 PROCEDURE — 93000 ELECTROCARDIOGRAM COMPLETE: CPT

## 2025-02-14 PROCEDURE — 99214 OFFICE O/P EST MOD 30 MIN: CPT

## 2025-02-14 PROCEDURE — G2211 COMPLEX E/M VISIT ADD ON: CPT

## 2025-02-26 DIAGNOSIS — Z01.818 ENCOUNTER FOR OTHER PREPROCEDURAL EXAMINATION: ICD-10-CM

## 2025-03-06 LAB
ALBUMIN SERPL ELPH-MCNC: 4.1 G/DL
ALP BLD-CCNC: 54 U/L
ALT SERPL-CCNC: 49 U/L
ANION GAP SERPL CALC-SCNC: 10 MMOL/L
AST SERPL-CCNC: 51 U/L
BASOPHILS # BLD AUTO: 0.02 K/UL
BASOPHILS NFR BLD AUTO: 0.2 %
BILIRUB SERPL-MCNC: 0.5 MG/DL
BUN SERPL-MCNC: 14 MG/DL
CALCIUM SERPL-MCNC: 9.6 MG/DL
CHLORIDE SERPL-SCNC: 103 MMOL/L
CO2 SERPL-SCNC: 29 MMOL/L
CREAT SERPL-MCNC: 0.84 MG/DL
EGFR: 99 ML/MIN/1.73M2
EOSINOPHIL # BLD AUTO: 0.09 K/UL
EOSINOPHIL NFR BLD AUTO: 1.1 %
ESTIMATED AVERAGE GLUCOSE: 123 MG/DL
GLUCOSE SERPL-MCNC: 107 MG/DL
HBA1C MFR BLD HPLC: 5.9 %
HCT VFR BLD CALC: 45.2 %
HGB BLD-MCNC: 15.3 G/DL
IMM GRANULOCYTES NFR BLD AUTO: 0.6 %
INR PPP: 1.1 RATIO
LYMPHOCYTES # BLD AUTO: 4.41 K/UL
LYMPHOCYTES NFR BLD AUTO: 53.6 %
MAN DIFF?: NORMAL
MCHC RBC-ENTMCNC: 31.4 PG
MCHC RBC-ENTMCNC: 33.8 G/DL
MCV RBC AUTO: 92.8 FL
MONOCYTES # BLD AUTO: 0.89 K/UL
MONOCYTES NFR BLD AUTO: 10.8 %
NEUTROPHILS # BLD AUTO: 2.77 K/UL
NEUTROPHILS NFR BLD AUTO: 33.7 %
PLATELET # BLD AUTO: 179 K/UL
POTASSIUM SERPL-SCNC: 4.4 MMOL/L
PROT SERPL-MCNC: 6.8 G/DL
PT BLD: 13 SEC
RBC # BLD: 4.87 M/UL
RBC # FLD: 14.8 %
SODIUM SERPL-SCNC: 142 MMOL/L
WBC # FLD AUTO: 8.23 K/UL

## 2025-04-09 ENCOUNTER — NON-APPOINTMENT (OUTPATIENT)
Age: 60
End: 2025-04-09

## 2025-04-11 ENCOUNTER — APPOINTMENT (OUTPATIENT)
Dept: HEART AND VASCULAR | Facility: CLINIC | Age: 60
End: 2025-04-11

## 2025-04-11 ENCOUNTER — NON-APPOINTMENT (OUTPATIENT)
Age: 60
End: 2025-04-11

## 2025-04-11 VITALS
HEIGHT: 69 IN | WEIGHT: 195 LBS | SYSTOLIC BLOOD PRESSURE: 122 MMHG | HEART RATE: 60 BPM | BODY MASS INDEX: 28.88 KG/M2 | DIASTOLIC BLOOD PRESSURE: 60 MMHG | OXYGEN SATURATION: 96 %

## 2025-04-11 DIAGNOSIS — Z86.73 PERSONAL HISTORY OF TRANSIENT ISCHEMIC ATTACK (TIA), AND CEREBRAL INFARCTION W/OUT RESIDUAL DEFICITS: ICD-10-CM

## 2025-04-11 DIAGNOSIS — Z87.74 PERSONAL HISTORY OF (CORRECTED) CONGENITAL MALFORMATIONS OF HEART AND CIRCULATORY SYSTEM: ICD-10-CM

## 2025-04-11 DIAGNOSIS — I48.0 PAROXYSMAL ATRIAL FIBRILLATION: ICD-10-CM

## 2025-04-11 DIAGNOSIS — I25.10 ATHEROSCLEROTIC HEART DISEASE OF NATIVE CORONARY ARTERY W/OUT ANGINA PECTORIS: ICD-10-CM

## 2025-04-11 PROCEDURE — 93000 ELECTROCARDIOGRAM COMPLETE: CPT

## 2025-04-11 PROCEDURE — 99214 OFFICE O/P EST MOD 30 MIN: CPT | Mod: 25

## 2025-08-05 ENCOUNTER — APPOINTMENT (OUTPATIENT)
Dept: NEUROLOGY | Facility: CLINIC | Age: 60
End: 2025-08-05
Payer: COMMERCIAL

## 2025-08-05 ENCOUNTER — NON-APPOINTMENT (OUTPATIENT)
Age: 60
End: 2025-08-05

## 2025-08-05 VITALS — OXYGEN SATURATION: 97 % | HEART RATE: 62 BPM | DIASTOLIC BLOOD PRESSURE: 82 MMHG | SYSTOLIC BLOOD PRESSURE: 120 MMHG

## 2025-08-05 DIAGNOSIS — M79.10 MYALGIA, UNSPECIFIED SITE: ICD-10-CM

## 2025-08-05 DIAGNOSIS — I63.9 CEREBRAL INFARCTION, UNSPECIFIED: ICD-10-CM

## 2025-08-05 PROCEDURE — 99214 OFFICE O/P EST MOD 30 MIN: CPT

## 2025-08-08 ENCOUNTER — LABORATORY RESULT (OUTPATIENT)
Age: 60
End: 2025-08-08

## 2025-08-13 ENCOUNTER — NON-APPOINTMENT (OUTPATIENT)
Age: 60
End: 2025-08-13

## 2025-08-13 LAB
ALBUMIN SERPL ELPH-MCNC: 4.1 G/DL
ALP BLD-CCNC: 54 U/L
ALT SERPL-CCNC: 40 U/L
ANION GAP SERPL CALC-SCNC: 12 MMOL/L
APO LP(A) SERPL-MCNC: <9 NMOL/L
AST SERPL-CCNC: 47 U/L
BILIRUB SERPL-MCNC: 0.6 MG/DL
BUN SERPL-MCNC: 14 MG/DL
CALCIUM SERPL-MCNC: 9.6 MG/DL
CHLORIDE SERPL-SCNC: 100 MMOL/L
CHOLEST SERPL-MCNC: 116 MG/DL
CO2 SERPL-SCNC: 27 MMOL/L
CREAT SERPL-MCNC: 0.86 MG/DL
EGFRCR SERPLBLD CKD-EPI 2021: 98 ML/MIN/1.73M2
ESTIMATED AVERAGE GLUCOSE: 117 MG/DL
GLUCOSE SERPL-MCNC: 100 MG/DL
HBA1C MFR BLD HPLC: 5.7 %
HDLC SERPL-MCNC: 32 MG/DL
LDLC SERPL-MCNC: 61 MG/DL
NONHDLC SERPL-MCNC: 84 MG/DL
POTASSIUM SERPL-SCNC: 4.1 MMOL/L
PROT SERPL-MCNC: 6.9 G/DL
SODIUM SERPL-SCNC: 139 MMOL/L
TRIGL SERPL-MCNC: 130 MG/DL
VALPROATE SERPL-MCNC: 95 UG/ML